# Patient Record
Sex: MALE | Race: WHITE | NOT HISPANIC OR LATINO | URBAN - METROPOLITAN AREA
[De-identification: names, ages, dates, MRNs, and addresses within clinical notes are randomized per-mention and may not be internally consistent; named-entity substitution may affect disease eponyms.]

---

## 2017-01-01 ENCOUNTER — OFFICE VISIT - HEALTHEAST (OUTPATIENT)
Dept: FAMILY MEDICINE | Facility: CLINIC | Age: 82
End: 2017-01-01

## 2017-01-01 ENCOUNTER — COMMUNICATION - HEALTHEAST (OUTPATIENT)
Dept: FAMILY MEDICINE | Facility: CLINIC | Age: 82
End: 2017-01-01

## 2017-01-01 ENCOUNTER — AMBULATORY - HEALTHEAST (OUTPATIENT)
Dept: CARDIOLOGY | Facility: CLINIC | Age: 82
End: 2017-01-01

## 2017-01-01 ENCOUNTER — OFFICE VISIT - HEALTHEAST (OUTPATIENT)
Dept: PULMONOLOGY | Facility: OTHER | Age: 82
End: 2017-01-01

## 2017-01-01 ENCOUNTER — RECORDS - HEALTHEAST (OUTPATIENT)
Dept: ADMINISTRATIVE | Facility: OTHER | Age: 82
End: 2017-01-01

## 2017-01-01 ENCOUNTER — RECORDS - HEALTHEAST (OUTPATIENT)
Dept: VASCULAR ULTRASOUND | Facility: CLINIC | Age: 82
End: 2017-01-01

## 2017-01-01 ENCOUNTER — OFFICE VISIT - HEALTHEAST (OUTPATIENT)
Dept: CARDIOLOGY | Facility: CLINIC | Age: 82
End: 2017-01-01

## 2017-01-01 ENCOUNTER — COMMUNICATION - HEALTHEAST (OUTPATIENT)
Dept: CARDIOLOGY | Facility: CLINIC | Age: 82
End: 2017-01-01

## 2017-01-01 ENCOUNTER — HOSPITAL ENCOUNTER (OUTPATIENT)
Dept: ULTRASOUND IMAGING | Facility: HOSPITAL | Age: 82
Discharge: HOME | End: 2017-09-26
Attending: INTERNAL MEDICINE

## 2017-01-01 ENCOUNTER — COMMUNICATION - HEALTHEAST (OUTPATIENT)
Dept: SCHEDULING | Facility: CLINIC | Age: 82
End: 2017-01-01

## 2017-01-01 ENCOUNTER — COMMUNICATION - HEALTHEAST (OUTPATIENT)
Dept: PHYSICAL THERAPY | Facility: CLINIC | Age: 82
End: 2017-01-01

## 2017-01-01 ENCOUNTER — OFFICE VISIT - HEALTHEAST (OUTPATIENT)
Dept: VASCULAR SURGERY | Facility: CLINIC | Age: 82
End: 2017-01-01

## 2017-01-01 ENCOUNTER — HOSPITAL ENCOUNTER (OUTPATIENT)
Dept: RADIOLOGY | Facility: HOSPITAL | Age: 82
Discharge: HOME | End: 2017-09-26
Attending: INTERNAL MEDICINE

## 2017-01-01 ENCOUNTER — AMBULATORY - HEALTHEAST (OUTPATIENT)
Dept: ULTRASOUND IMAGING | Facility: HOSPITAL | Age: 82
End: 2017-01-01

## 2017-01-01 DIAGNOSIS — I48.19 PERSISTENT ATRIAL FIBRILLATION (H): ICD-10-CM

## 2017-01-01 DIAGNOSIS — Z98.890 HX OF ENDARTERECTOMY: ICD-10-CM

## 2017-01-01 DIAGNOSIS — J90 PLEURAL EFFUSION, RIGHT: ICD-10-CM

## 2017-01-01 DIAGNOSIS — R05.9 COUGH: ICD-10-CM

## 2017-01-01 DIAGNOSIS — J90 PLEURAL EFFUSION: ICD-10-CM

## 2017-01-01 DIAGNOSIS — I25.10 ATHEROSCLEROSIS OF NATIVE CORONARY ARTERY OF NATIVE HEART WITHOUT ANGINA PECTORIS: ICD-10-CM

## 2017-01-01 DIAGNOSIS — I73.9 PERIPHERAL VASCULAR DISEASE, UNSPECIFIED (H): ICD-10-CM

## 2017-01-01 DIAGNOSIS — J01.90 ACUTE SINUSITIS: ICD-10-CM

## 2017-01-01 DIAGNOSIS — I25.10 CORONARY ARTERY DISEASE DUE TO CALCIFIED CORONARY LESION: ICD-10-CM

## 2017-01-01 DIAGNOSIS — I10 HTN (HYPERTENSION): ICD-10-CM

## 2017-01-01 DIAGNOSIS — Z00.00 HEALTHCARE MAINTENANCE: ICD-10-CM

## 2017-01-01 DIAGNOSIS — K64.4 EXTERNAL HEMORRHOID: ICD-10-CM

## 2017-01-01 DIAGNOSIS — L30.9 PERIANAL DERMATITIS: ICD-10-CM

## 2017-01-01 DIAGNOSIS — I73.9 PAD (PERIPHERAL ARTERY DISEASE) (H): ICD-10-CM

## 2017-01-01 DIAGNOSIS — R06.02 SOB (SHORTNESS OF BREATH): ICD-10-CM

## 2017-01-01 DIAGNOSIS — I10 ESSENTIAL HYPERTENSION: ICD-10-CM

## 2017-01-01 DIAGNOSIS — I25.84 CORONARY ARTERY DISEASE DUE TO CALCIFIED CORONARY LESION: ICD-10-CM

## 2017-01-01 DIAGNOSIS — E78.5 HLD (HYPERLIPIDEMIA): ICD-10-CM

## 2017-01-01 DIAGNOSIS — K92.1 HEMATOCHEZIA: ICD-10-CM

## 2017-01-01 DIAGNOSIS — I73.9 PVD (PERIPHERAL VASCULAR DISEASE) (H): ICD-10-CM

## 2017-01-01 DIAGNOSIS — Z23 NEED FOR VACCINATION: ICD-10-CM

## 2017-01-01 DIAGNOSIS — Z87.891 HISTORY OF TOBACCO ABUSE: ICD-10-CM

## 2017-01-01 LAB
BACTERIA SPEC CULT: NORMAL
LAB AP CHARGES (HE HISTORICAL CONVERSION): ABNORMAL
LAB AP CHARGES (HE HISTORICAL CONVERSION): NORMAL
LAB MED GENERAL PATH INTERP (HE HISTORICAL CONVERSION): ABNORMAL
PATH REPORT.COMMENTS IMP SPEC: ABNORMAL
PATH REPORT.COMMENTS IMP SPEC: NORMAL
PATH REPORT.COMMENTS IMP SPEC: NORMAL
PATH REPORT.FINAL DX SPEC: ABNORMAL
PATH REPORT.FINAL DX SPEC: NORMAL
PATH REPORT.MICROSCOPIC SPEC OTHER STN: ABNORMAL
PATH REPORT.MICROSCOPIC SPEC OTHER STN: NORMAL
PATH REPORT.RELEVANT HX SPEC: ABNORMAL
RESULT FLAG (HE HISTORICAL CONVERSION): NORMAL
SPECIMEN DESCRIPTION: ABNORMAL

## 2017-01-01 ASSESSMENT — MIFFLIN-ST. JEOR
SCORE: 1435.57
SCORE: 1426.49
SCORE: 1440.1
SCORE: 1433.3

## 2017-01-05 ENCOUNTER — HOSPITAL ENCOUNTER (OUTPATIENT)
Dept: CARDIOLOGY | Facility: CLINIC | Age: 82
Discharge: HOME | End: 2017-01-05
Attending: NURSE PRACTITIONER

## 2017-01-05 DIAGNOSIS — I48.19 PERSISTENT ATRIAL FIBRILLATION (H): ICD-10-CM

## 2017-01-12 ENCOUNTER — AMBULATORY - HEALTHEAST (OUTPATIENT)
Dept: CARDIOLOGY | Facility: CLINIC | Age: 82
End: 2017-01-12

## 2017-01-12 DIAGNOSIS — I48.19 PERSISTENT ATRIAL FIBRILLATION (H): ICD-10-CM

## 2017-01-24 ENCOUNTER — COMMUNICATION - HEALTHEAST (OUTPATIENT)
Dept: FAMILY MEDICINE | Facility: CLINIC | Age: 82
End: 2017-01-24

## 2017-01-24 DIAGNOSIS — I10 ESSENTIAL HYPERTENSION: ICD-10-CM

## 2017-01-24 DIAGNOSIS — I25.10 CORONARY ATHEROSCLEROSIS: ICD-10-CM

## 2017-01-24 DIAGNOSIS — I73.9 PERIPHERAL VASCULAR DISEASE, UNSPECIFIED (H): ICD-10-CM

## 2017-01-26 ENCOUNTER — COMMUNICATION - HEALTHEAST (OUTPATIENT)
Dept: FAMILY MEDICINE | Facility: CLINIC | Age: 82
End: 2017-01-26

## 2017-02-06 ENCOUNTER — OFFICE VISIT - HEALTHEAST (OUTPATIENT)
Dept: CARDIOLOGY | Facility: CLINIC | Age: 82
End: 2017-02-06

## 2017-02-06 DIAGNOSIS — I25.10 CORONARY ATHEROSCLEROSIS DUE TO CALCIFIED CORONARY LESION: ICD-10-CM

## 2017-02-06 DIAGNOSIS — I25.84 CORONARY ATHEROSCLEROSIS DUE TO CALCIFIED CORONARY LESION: ICD-10-CM

## 2017-02-06 ASSESSMENT — MIFFLIN-ST. JEOR: SCORE: 1461.64

## 2017-02-09 ENCOUNTER — AMBULATORY - HEALTHEAST (OUTPATIENT)
Dept: CARDIOLOGY | Facility: CLINIC | Age: 82
End: 2017-02-09

## 2017-02-09 ENCOUNTER — COMMUNICATION - HEALTHEAST (OUTPATIENT)
Dept: CARDIOLOGY | Facility: CLINIC | Age: 82
End: 2017-02-09

## 2017-02-09 DIAGNOSIS — I48.19 PERSISTENT ATRIAL FIBRILLATION (H): ICD-10-CM

## 2017-02-16 ENCOUNTER — OFFICE VISIT - HEALTHEAST (OUTPATIENT)
Dept: FAMILY MEDICINE | Facility: CLINIC | Age: 82
End: 2017-02-16

## 2017-02-16 ENCOUNTER — COMMUNICATION - HEALTHEAST (OUTPATIENT)
Dept: FAMILY MEDICINE | Facility: CLINIC | Age: 82
End: 2017-02-16

## 2017-02-16 DIAGNOSIS — Z13.29 SCREENING FOR ENDOCRINE DISORDER: ICD-10-CM

## 2017-02-16 DIAGNOSIS — I48.19 PERSISTENT ATRIAL FIBRILLATION (H): ICD-10-CM

## 2017-02-16 DIAGNOSIS — E78.5 HYPERLIPIDEMIA: ICD-10-CM

## 2017-02-16 DIAGNOSIS — I25.10 ATHEROSCLEROSIS OF NATIVE CORONARY ARTERY OF NATIVE HEART WITHOUT ANGINA PECTORIS: ICD-10-CM

## 2017-02-16 DIAGNOSIS — I10 ESSENTIAL HYPERTENSION: ICD-10-CM

## 2017-02-16 DIAGNOSIS — I73.9 PVD (PERIPHERAL VASCULAR DISEASE) (H): ICD-10-CM

## 2017-02-16 LAB
CHOLEST SERPL-MCNC: 136 MG/DL
FASTING STATUS PATIENT QL REPORTED: YES
HDLC SERPL-MCNC: 40 MG/DL
LDLC SERPL CALC-MCNC: 59 MG/DL
TRIGL SERPL-MCNC: 186 MG/DL

## 2017-02-16 ASSESSMENT — MIFFLIN-ST. JEOR: SCORE: 1438.74

## 2017-02-20 ENCOUNTER — RECORDS - HEALTHEAST (OUTPATIENT)
Dept: ADMINISTRATIVE | Facility: OTHER | Age: 82
End: 2017-02-20

## 2017-03-09 ENCOUNTER — AMBULATORY - HEALTHEAST (OUTPATIENT)
Dept: CARDIOLOGY | Facility: CLINIC | Age: 82
End: 2017-03-09

## 2017-03-09 DIAGNOSIS — I48.19 PERSISTENT ATRIAL FIBRILLATION (H): ICD-10-CM

## 2017-03-27 ENCOUNTER — RECORDS - HEALTHEAST (OUTPATIENT)
Dept: ADMINISTRATIVE | Facility: OTHER | Age: 82
End: 2017-03-27

## 2017-04-06 ENCOUNTER — AMBULATORY - HEALTHEAST (OUTPATIENT)
Dept: CARDIOLOGY | Facility: CLINIC | Age: 82
End: 2017-04-06

## 2017-04-06 DIAGNOSIS — I48.19 PERSISTENT ATRIAL FIBRILLATION (H): ICD-10-CM

## 2018-01-01 ENCOUNTER — AMBULATORY - HEALTHEAST (OUTPATIENT)
Dept: CARDIOLOGY | Facility: CLINIC | Age: 83
End: 2018-01-01

## 2018-01-01 ENCOUNTER — AMBULATORY - HEALTHEAST (OUTPATIENT)
Dept: MULTI SPECIALTY CLINIC | Facility: CLINIC | Age: 83
End: 2018-01-01

## 2018-01-01 ENCOUNTER — COMMUNICATION - HEALTHEAST (OUTPATIENT)
Dept: FAMILY MEDICINE | Facility: CLINIC | Age: 83
End: 2018-01-01

## 2018-01-01 ENCOUNTER — HOME CARE/HOSPICE - HEALTHEAST (OUTPATIENT)
Dept: HOSPICE | Facility: HOSPICE | Age: 83
End: 2018-01-01

## 2018-01-01 ENCOUNTER — HOSPITAL ENCOUNTER (OUTPATIENT)
Dept: RADIOLOGY | Facility: HOSPITAL | Age: 83
Discharge: HOME | End: 2018-02-07
Attending: INTERNAL MEDICINE

## 2018-01-01 ENCOUNTER — HOSPITAL ENCOUNTER (OUTPATIENT)
Dept: RESPIRATORY THERAPY | Facility: HOSPITAL | Age: 83
Discharge: HOME | End: 2018-03-15
Attending: SURGERY

## 2018-01-01 ENCOUNTER — AMBULATORY - HEALTHEAST (OUTPATIENT)
Dept: HOSPICE | Facility: HOSPICE | Age: 83
End: 2018-01-01

## 2018-01-01 ENCOUNTER — ANESTHESIA - HEALTHEAST (OUTPATIENT)
Dept: SURGERY | Facility: CLINIC | Age: 83
End: 2018-01-01

## 2018-01-01 ENCOUNTER — HOSPITAL ENCOUNTER (OUTPATIENT)
Dept: NUCLEAR MEDICINE | Facility: HOSPITAL | Age: 83
Discharge: HOME | End: 2018-03-19
Attending: INTERNAL MEDICINE

## 2018-01-01 ENCOUNTER — COMMUNICATION - HEALTHEAST (OUTPATIENT)
Dept: CARDIOLOGY | Facility: CLINIC | Age: 83
End: 2018-01-01

## 2018-01-01 ENCOUNTER — HOME CARE/HOSPICE - HEALTHEAST (OUTPATIENT)
Dept: HOME HEALTH SERVICES | Facility: HOME HEALTH | Age: 83
End: 2018-01-01

## 2018-01-01 ENCOUNTER — RECORDS - HEALTHEAST (OUTPATIENT)
Dept: ADMINISTRATIVE | Facility: OTHER | Age: 83
End: 2018-01-01

## 2018-01-01 ENCOUNTER — OFFICE VISIT - HEALTHEAST (OUTPATIENT)
Dept: PULMONOLOGY | Facility: OTHER | Age: 83
End: 2018-01-01

## 2018-01-01 ENCOUNTER — OFFICE VISIT - HEALTHEAST (OUTPATIENT)
Dept: FAMILY MEDICINE | Facility: CLINIC | Age: 83
End: 2018-01-01

## 2018-01-01 ENCOUNTER — COMMUNICATION - HEALTHEAST (OUTPATIENT)
Dept: PULMONOLOGY | Facility: OTHER | Age: 83
End: 2018-01-01

## 2018-01-01 ENCOUNTER — AMBULATORY - HEALTHEAST (OUTPATIENT)
Dept: ONCOLOGY | Facility: CLINIC | Age: 83
End: 2018-01-01

## 2018-01-01 ENCOUNTER — AMBULATORY - HEALTHEAST (OUTPATIENT)
Dept: ULTRASOUND IMAGING | Facility: HOSPITAL | Age: 83
End: 2018-01-01

## 2018-01-01 ENCOUNTER — OFFICE VISIT - HEALTHEAST (OUTPATIENT)
Dept: CARDIOLOGY | Facility: CLINIC | Age: 83
End: 2018-01-01

## 2018-01-01 ENCOUNTER — HOSPITAL ENCOUNTER (OUTPATIENT)
Dept: CT IMAGING | Facility: HOSPITAL | Age: 83
Discharge: HOME | End: 2018-02-19
Attending: INTERNAL MEDICINE

## 2018-01-01 ENCOUNTER — HOSPITAL ENCOUNTER (OUTPATIENT)
Dept: RADIOLOGY | Facility: HOSPITAL | Age: 83
Discharge: HOME | End: 2018-02-08

## 2018-01-01 ENCOUNTER — SURGERY - HEALTHEAST (OUTPATIENT)
Dept: SURGERY | Facility: CLINIC | Age: 83
End: 2018-01-01

## 2018-01-01 ENCOUNTER — HOSPITAL ENCOUNTER (OUTPATIENT)
Dept: RADIOLOGY | Facility: HOSPITAL | Age: 83
Discharge: HOME | End: 2018-03-05
Attending: INTERNAL MEDICINE

## 2018-01-01 ENCOUNTER — HOSPITAL ENCOUNTER (OUTPATIENT)
Dept: CARDIOLOGY | Facility: HOSPITAL | Age: 83
Discharge: HOME | End: 2018-03-19
Attending: INTERNAL MEDICINE

## 2018-01-01 ENCOUNTER — COMMUNICATION - HEALTHEAST (OUTPATIENT)
Dept: ADMINISTRATIVE | Facility: CLINIC | Age: 83
End: 2018-01-01

## 2018-01-01 ENCOUNTER — AMBULATORY - HEALTHEAST (OUTPATIENT)
Dept: PULMONOLOGY | Facility: OTHER | Age: 83
End: 2018-01-01

## 2018-01-01 ENCOUNTER — HOSPITAL ENCOUNTER (OUTPATIENT)
Dept: ULTRASOUND IMAGING | Facility: HOSPITAL | Age: 83
Discharge: HOME | End: 2018-02-19
Attending: INTERNAL MEDICINE | Admitting: RADIOLOGY

## 2018-01-01 DIAGNOSIS — I48.19 PERSISTENT ATRIAL FIBRILLATION (H): ICD-10-CM

## 2018-01-01 DIAGNOSIS — R13.10 DYSPHAGIA: ICD-10-CM

## 2018-01-01 DIAGNOSIS — I25.10 CORONARY ATHEROSCLEROSIS DUE TO CALCIFIED CORONARY LESION: ICD-10-CM

## 2018-01-01 DIAGNOSIS — I10 ESSENTIAL HYPERTENSION: ICD-10-CM

## 2018-01-01 DIAGNOSIS — R55 SYNCOPAL EPISODES: ICD-10-CM

## 2018-01-01 DIAGNOSIS — D64.9 NORMOCHROMIC NORMOCYTIC ANEMIA: ICD-10-CM

## 2018-01-01 DIAGNOSIS — I25.84 CORONARY ATHEROSCLEROSIS DUE TO CALCIFIED CORONARY LESION: ICD-10-CM

## 2018-01-01 DIAGNOSIS — I25.10 ATHEROSCLEROSIS OF NATIVE CORONARY ARTERY OF NATIVE HEART WITHOUT ANGINA PECTORIS: ICD-10-CM

## 2018-01-01 DIAGNOSIS — R94.2 ABNORMAL PFTS: ICD-10-CM

## 2018-01-01 DIAGNOSIS — J90 PLEURAL EFFUSION: ICD-10-CM

## 2018-01-01 DIAGNOSIS — C45.7 MESOTHELIOMA OF RIGHT LUNG (H): ICD-10-CM

## 2018-01-01 DIAGNOSIS — R05.9 COUGH: ICD-10-CM

## 2018-01-01 DIAGNOSIS — J90 RECURRENT RIGHT PLEURAL EFFUSION: ICD-10-CM

## 2018-01-01 DIAGNOSIS — Z01.810 PREOP CARDIOVASCULAR EXAM: ICD-10-CM

## 2018-01-01 DIAGNOSIS — E78.00 HYPERCHOLESTEREMIA: ICD-10-CM

## 2018-01-01 DIAGNOSIS — R06.09 DYSPNEA ON EXERTION: ICD-10-CM

## 2018-01-01 DIAGNOSIS — I49.5 SSS (SICK SINUS SYNDROME) (H): ICD-10-CM

## 2018-01-01 DIAGNOSIS — Z78.9 BASELINE FORCED EXPIRATORY VOLUME AT END OF ONE SECOND (FEV1) 30% TO 80% PREDICTED: ICD-10-CM

## 2018-01-01 DIAGNOSIS — R53.83 FATIGUE: ICD-10-CM

## 2018-01-01 LAB
ALBUMIN SERPL-MCNC: 3.7 G/DL (ref 3.5–5)
ALP SERPL-CCNC: 91 U/L (ref 45–120)
ALT SERPL W P-5'-P-CCNC: 19 U/L (ref 0–45)
ANION GAP SERPL CALCULATED.3IONS-SCNC: 12 MMOL/L (ref 5–18)
ANION GAP SERPL CALCULATED.3IONS-SCNC: 8 MMOL/L (ref 5–18)
ANION GAP SERPL CALCULATED.3IONS-SCNC: 9 MMOL/L (ref 5–18)
APPEARANCE FLD: ABNORMAL
AST SERPL W P-5'-P-CCNC: 22 U/L (ref 0–40)
ATRIAL RATE - MUSE: 357 BPM
BACTERIA SPEC CULT: NO GROWTH
BASE EXCESS BLDA CALC-SCNC: 2.3 MMOL/L
BILIRUB SERPL-MCNC: 0.6 MG/DL (ref 0–1)
BUN SERPL-MCNC: 18 MG/DL (ref 8–28)
BUN SERPL-MCNC: 21 MG/DL (ref 8–28)
BUN SERPL-MCNC: 21 MG/DL (ref 8–28)
CALCIUM SERPL-MCNC: 8.6 MG/DL (ref 8.5–10.5)
CALCIUM SERPL-MCNC: 9.3 MG/DL (ref 8.5–10.5)
CALCIUM SERPL-MCNC: 9.4 MG/DL (ref 8.5–10.5)
CHLORIDE BLD-SCNC: 100 MMOL/L (ref 98–107)
CHLORIDE BLD-SCNC: 100 MMOL/L (ref 98–107)
CHLORIDE BLD-SCNC: 98 MMOL/L (ref 98–107)
CO2 SERPL-SCNC: 24 MMOL/L (ref 22–31)
CO2 SERPL-SCNC: 28 MMOL/L (ref 22–31)
CO2 SERPL-SCNC: 29 MMOL/L (ref 22–31)
COHGB MFR BLD: 93.9 % (ref 95–96)
COLOR FLD: ABNORMAL
CREAT SERPL-MCNC: 0.86 MG/DL (ref 0.7–1.3)
CREAT SERPL-MCNC: 0.92 MG/DL (ref 0.7–1.3)
CREAT SERPL-MCNC: 1.02 MG/DL (ref 0.7–1.3)
CV STRESS CURRENT BP HE: NORMAL
CV STRESS CURRENT HR HE: 102
CV STRESS CURRENT HR HE: 103
CV STRESS CURRENT HR HE: 104
CV STRESS CURRENT HR HE: 105
CV STRESS CURRENT HR HE: 109
CV STRESS CURRENT HR HE: 89
CV STRESS CURRENT HR HE: 92
CV STRESS CURRENT HR HE: 92
CV STRESS CURRENT HR HE: 94
CV STRESS CURRENT HR HE: 94
CV STRESS CURRENT HR HE: 95
CV STRESS CURRENT HR HE: 96
CV STRESS CURRENT HR HE: 97
CV STRESS CURRENT HR HE: 97
CV STRESS DEVIATION TIME HE: NORMAL
CV STRESS ECHO PERCENT HR HE: NORMAL
CV STRESS EXERCISE STAGE HE: NORMAL
CV STRESS FINAL RESTING BP HE: NORMAL
CV STRESS FINAL RESTING HR HE: 104
CV STRESS MAX HR HE: 113
CV STRESS MAX TREADMILL GRADE HE: 0
CV STRESS MAX TREADMILL SPEED HE: 0
CV STRESS PEAK DIA BP HE: NORMAL
CV STRESS PEAK SYS BP HE: NORMAL
CV STRESS PHASE HE: NORMAL
CV STRESS PROTOCOL HE: NORMAL
CV STRESS RESTING PT POSITION HE: NORMAL
CV STRESS ST DEVIATION AMOUNT HE: NORMAL
CV STRESS ST DEVIATION ELEVATION HE: NORMAL
CV STRESS ST EVELATION AMOUNT HE: NORMAL
CV STRESS TEST TYPE HE: NORMAL
CV STRESS TOTAL STAGE TIME MIN 1 HE: NORMAL
DIASTOLIC BLOOD PRESSURE - MUSE: NORMAL MMHG
EOSINOPHIL NFR FLD MANUAL: ABNORMAL %
ERYTHROCYTE [DISTWIDTH] IN BLOOD BY AUTOMATED COUNT: 12 % (ref 11–14.5)
ERYTHROCYTE [DISTWIDTH] IN BLOOD BY AUTOMATED COUNT: 12.1 % (ref 11–14.5)
ERYTHROCYTE [DISTWIDTH] IN BLOOD BY AUTOMATED COUNT: 12.8 % (ref 11–14.5)
GFR SERPL CREATININE-BSD FRML MDRD: >60 ML/MIN/1.73M2
GLUCOSE BLD-MCNC: 105 MG/DL (ref 70–125)
GLUCOSE BLD-MCNC: 122 MG/DL (ref 70–125)
GLUCOSE BLD-MCNC: 129 MG/DL (ref 70–125)
GLUCOSE FLD-MCNC: 45 MG/DL
GRAM STAIN RESULT: NORMAL
GRAM STAIN RESULT: NORMAL
HCO3, ARTERIAL CALC - HISTORICAL: 26.6 MMOL/L (ref 23–29)
HCT VFR BLD AUTO: 41.1 % (ref 40–54)
HCT VFR BLD AUTO: 43.4 % (ref 40–54)
HCT VFR BLD AUTO: 43.9 % (ref 40–54)
HGB BLD-MCNC: 13.2 G/DL (ref 14–18)
HGB BLD-MCNC: 14.2 G/DL (ref 14–18)
HGB BLD-MCNC: 14.2 G/DL (ref 14–18)
INTERNATIONAL NORMALIZATION RATIO, POC - HISTORICAL: 1.3
INTERNATIONAL NORMALIZATION RATIO, POC - HISTORICAL: 2
INTERNATIONAL NORMALIZATION RATIO, POC - HISTORICAL: 2
INTERNATIONAL NORMALIZATION RATIO, POC - HISTORICAL: 2.3
INTERPRETATION ECG - MUSE: NORMAL
LAB AP CHARGES (HE HISTORICAL CONVERSION): ABNORMAL
LAB MED GENERAL PATH INTERP (HE HISTORICAL CONVERSION): ABNORMAL
LDH FLD L TO P-CCNC: 1066 U/L
LIPASE FLD-CCNC: 36 U/L
LYMPHOCYTES NFR FLD MANUAL: 71 %
Lab: NORMAL
MACROPHAGE % - HISTORICAL: 2 %
MCH RBC QN AUTO: 28 PG (ref 27–34)
MCH RBC QN AUTO: 29.6 PG (ref 27–34)
MCH RBC QN AUTO: 30.3 PG (ref 27–34)
MCHC RBC AUTO-ENTMCNC: 32.2 G/DL (ref 32–36)
MCHC RBC AUTO-ENTMCNC: 32.3 G/DL (ref 32–36)
MCHC RBC AUTO-ENTMCNC: 32.7 G/DL (ref 32–36)
MCV RBC AUTO: 87 FL (ref 80–100)
MCV RBC AUTO: 91 FL (ref 80–100)
MCV RBC AUTO: 94 FL (ref 80–100)
MESOTHELIALS, FLUID: ABNORMAL %
MONOCYTE % - HISTORICAL: 25 %
NEUTS BAND NFR FLD MANUAL: 2 %
NUC STRESS EJECTION FRACTION: 69 %
O2/TOTAL GAS SETTING VFR VENT: ABNORMAL %
OTHER CELLS FLD MANUAL: ABNORMAL %
OXYHEMOGLOBIN - HISTORICAL: 91 % (ref 95–96)
P AXIS - MUSE: NORMAL DEGREES
PATH REPORT.COMMENTS IMP SPEC: ABNORMAL
PATH REPORT.FINAL DX SPEC: ABNORMAL
PATH REPORT.MICROSCOPIC SPEC OTHER STN: ABNORMAL
PATH REPORT.RELEVANT HX SPEC: ABNORMAL
PCO2 BLD: 39 MM HG (ref 35–45)
PH BLD: 7.44 [PH] (ref 7.37–7.44)
PLATELET # BLD AUTO: 216 THOU/UL (ref 140–440)
PLATELET # BLD AUTO: 225 THOU/UL (ref 140–440)
PLATELET # BLD AUTO: 329 THOU/UL (ref 140–440)
PMV BLD AUTO: 6.9 FL (ref 7–10)
PMV BLD AUTO: 7.4 FL (ref 7–10)
PMV BLD AUTO: 7.6 FL (ref 7–10)
PO2 BLD: 59 MM HG (ref 75–85)
POTASSIUM BLD-SCNC: 4.2 MMOL/L (ref 3.5–5)
POTASSIUM BLD-SCNC: 4.4 MMOL/L (ref 3.5–5)
POTASSIUM BLD-SCNC: 4.8 MMOL/L (ref 3.5–5)
PR INTERVAL - MUSE: NORMAL MS
PROT FLD-MCNC: 5 G/DL
PROT SERPL-MCNC: 7.9 G/DL (ref 6–8)
QRS DURATION - MUSE: 90 MS
QT - MUSE: 374 MS
QTC - MUSE: 431 MS
R AXIS - MUSE: 14 DEGREES
RBC # BLD AUTO: 4.67 MILL/UL (ref 4.4–6.2)
RBC # BLD AUTO: 4.72 MILL/UL (ref 4.4–6.2)
RBC # BLD AUTO: 4.79 MILL/UL (ref 4.4–6.2)
RBC FLUID - HISTORICAL: ABNORMAL /UL
RESULT FLAG (HE HISTORICAL CONVERSION): ABNORMAL
SODIUM SERPL-SCNC: 134 MMOL/L (ref 136–145)
SODIUM SERPL-SCNC: 136 MMOL/L (ref 136–145)
SODIUM SERPL-SCNC: 138 MMOL/L (ref 136–145)
SPECIMEN DESCRIPTION: ABNORMAL
STRESS ECHO BASELINE BP: NORMAL
STRESS ECHO BASELINE HR: 88
STRESS ECHO CALCULATED PERCENT HR: 84 %
STRESS ECHO LAST STRESS BP: NORMAL
STRESS ECHO LAST STRESS HR: 105
SYSTOLIC BLOOD PRESSURE - MUSE: NORMAL MMHG
T AXIS - MUSE: 68 DEGREES
TEMPERATURE: 37 DEGREES C
TRIGL FLD-MCNC: 15.3 MG/DL
TSH SERPL DL<=0.005 MIU/L-ACNC: 1.91 UIU/ML (ref 0.3–5)
VENTILATION MODE: ABNORMAL
VENTRICULAR RATE- MUSE: 80 BPM
WBC # FLD AUTO: 1127 /UL (ref 0–99)
WBC: 7.1 THOU/UL (ref 4–11)
WBC: 7.5 THOU/UL (ref 4–11)
WBC: 9.2 THOU/UL (ref 4–11)

## 2018-01-01 RX ORDER — HALOPERIDOL 2 MG/ML
0.5 SOLUTION ORAL EVERY 4 HOURS PRN
Status: SHIPPED | COMMUNITY
Start: 2018-01-01

## 2018-01-01 RX ORDER — HYDROMORPHONE HYDROCHLORIDE 1 MG/ML
2-4 SOLUTION ORAL
Status: SHIPPED | COMMUNITY
Start: 2018-01-01

## 2018-01-01 RX ORDER — DEXTROMETHORPHAN HBR. AND GUAIFENESIN 10; 100 MG/5ML; MG/5ML
10 SOLUTION ORAL EVERY 4 HOURS PRN
Status: SHIPPED | COMMUNITY
Start: 2018-01-01

## 2018-01-01 RX ORDER — SALIVA STIMULANT COMB. NO.3
1-2 SPRAY, NON-AEROSOL (ML) MUCOUS MEMBRANE PRN
Status: SHIPPED | COMMUNITY
Start: 2018-01-01

## 2018-01-01 ASSESSMENT — MIFFLIN-ST. JEOR
SCORE: 1317.34
SCORE: 1311.67
SCORE: 1369.79
SCORE: 1354.37
SCORE: 1378.86
SCORE: 1364.35
SCORE: 1360.72
SCORE: 1365.82
SCORE: 1416.28
SCORE: 1374.89
SCORE: 1360.27

## 2018-04-30 ENCOUNTER — COMMUNICATION - HEALTHEAST (OUTPATIENT)
Dept: FAMILY MEDICINE | Facility: CLINIC | Age: 83
End: 2018-04-30

## 2018-04-30 DIAGNOSIS — I10 ESSENTIAL HYPERTENSION: ICD-10-CM

## 2018-06-12 ENCOUNTER — COMMUNICATION - HEALTHEAST (OUTPATIENT)
Dept: ANTICOAGULATION | Facility: CLINIC | Age: 83
End: 2018-06-12

## 2018-06-12 DIAGNOSIS — I48.19 PERSISTENT ATRIAL FIBRILLATION (H): ICD-10-CM

## 2021-05-25 ENCOUNTER — RECORDS - HEALTHEAST (OUTPATIENT)
Dept: ADMINISTRATIVE | Facility: CLINIC | Age: 86
End: 2021-05-25

## 2021-05-26 ENCOUNTER — RECORDS - HEALTHEAST (OUTPATIENT)
Dept: ADMINISTRATIVE | Facility: CLINIC | Age: 86
End: 2021-05-26

## 2021-05-27 ENCOUNTER — RECORDS - HEALTHEAST (OUTPATIENT)
Dept: ADMINISTRATIVE | Facility: CLINIC | Age: 86
End: 2021-05-27

## 2021-05-28 ENCOUNTER — RECORDS - HEALTHEAST (OUTPATIENT)
Dept: ADMINISTRATIVE | Facility: CLINIC | Age: 86
End: 2021-05-28

## 2021-05-29 ENCOUNTER — RECORDS - HEALTHEAST (OUTPATIENT)
Dept: ADMINISTRATIVE | Facility: CLINIC | Age: 86
End: 2021-05-29

## 2021-05-30 VITALS — WEIGHT: 172.7 LBS | BODY MASS INDEX: 25.58 KG/M2 | HEIGHT: 69 IN

## 2021-05-30 VITALS — WEIGHT: 176 LBS | HEIGHT: 70 IN | BODY MASS INDEX: 25.2 KG/M2

## 2021-05-30 VITALS — BODY MASS INDEX: 25.55 KG/M2 | WEIGHT: 173 LBS

## 2021-05-31 ENCOUNTER — RECORDS - HEALTHEAST (OUTPATIENT)
Dept: ADMINISTRATIVE | Facility: CLINIC | Age: 86
End: 2021-05-31

## 2021-05-31 VITALS — HEIGHT: 69 IN | BODY MASS INDEX: 25.48 KG/M2 | WEIGHT: 172 LBS

## 2021-05-31 VITALS — WEIGHT: 171.7 LBS | BODY MASS INDEX: 25.36 KG/M2

## 2021-05-31 VITALS — HEIGHT: 69 IN | BODY MASS INDEX: 25.4 KG/M2 | WEIGHT: 171.5 LBS

## 2021-05-31 VITALS
BODY MASS INDEX: 25.18 KG/M2 | WEIGHT: 170 LBS | BODY MASS INDEX: 25.62 KG/M2 | HEIGHT: 69 IN | HEIGHT: 69 IN | WEIGHT: 173 LBS

## 2021-05-31 VITALS — BODY MASS INDEX: 24.89 KG/M2 | WEIGHT: 171 LBS

## 2021-05-31 VITALS — WEIGHT: 171 LBS | BODY MASS INDEX: 25.25 KG/M2

## 2021-05-31 VITALS — BODY MASS INDEX: 25.55 KG/M2 | WEIGHT: 173 LBS

## 2021-06-01 ENCOUNTER — RECORDS - HEALTHEAST (OUTPATIENT)
Dept: ADMINISTRATIVE | Facility: CLINIC | Age: 86
End: 2021-06-01

## 2021-06-01 VITALS — WEIGHT: 149.44 LBS | BODY MASS INDEX: 22.65 KG/M2 | HEIGHT: 68 IN

## 2021-06-01 VITALS — HEIGHT: 68 IN | BODY MASS INDEX: 24.71 KG/M2 | WEIGHT: 163 LBS

## 2021-06-01 VITALS — WEIGHT: 166 LBS | HEIGHT: 70 IN | BODY MASS INDEX: 23.77 KG/M2

## 2021-06-01 VITALS — WEIGHT: 148 LBS | BODY MASS INDEX: 22.5 KG/M2

## 2021-06-01 VITALS — BODY MASS INDEX: 24.1 KG/M2 | WEIGHT: 159 LBS | HEIGHT: 68 IN

## 2021-06-01 VITALS — WEIGHT: 172 LBS | BODY MASS INDEX: 25.04 KG/M2

## 2021-06-01 VITALS — WEIGHT: 173 LBS | BODY MASS INDEX: 25.18 KG/M2
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
## 2021-06-08 NOTE — PROGRESS NOTES
"Cardiology Progress Note    Assessment:  Coronary artery disease, status post multivessel stenting, last one in 2009, stable, no angina.    Sinus node dysfunction, mild, asymptomatic  Persistent atrial fibrillation, rate controlled, asymptomatic, on warfarin anticoagulation  Hypertension, controlled  Dyslipidemia, good control of LDL cholesterol  Peripheral arterial disease status post right femoral popliteal bypass in July 2015      Plan:  Rate control strategy for management of atrial fibrillation  I discussed with him different anticoagulants.  For now he wants to continue take warfarin.  He does not need to be taking antiplatelet agents.  He will stop aspirin to minimize risk of bleeding.  Follow-up in 6 months    Subjective:   This is 85 y.o. male who comes in today for follow-up visit.  Last year he noted that his heart rate became much faster than normal.  He typically runs heart rate in 30s and 40s at rest.  He was noticing heart rate in the 60s and 80s.  EKG was performed.  It showed atrial fibrillation with controlled ventricular response.  He had no symptoms of shortness of breath or heart palpitations.  He was seen by EP nurse practitioner.  She recommended anticoagulation and rate control strategy.  Today the patient has no new complaints.  He is active.  He goes for long walks 5-6 times a week.  He has not had any new symptoms.     Review of Systems:   General: WNL  Eyes: WNL  Ears/Nose/Throat: WNL  Lungs: WNL  Heart: Leg Swelling  Stomach: WNL  Bladder: WNL  Muscle/Joints: WNL  Skin: WNL  Nervous System: WNL  Mental Health: WNL     Blood: Easy Bruising    Objective:   Visit Vitals     /76 (Patient Site: Right Arm, Patient Position: Sitting, Cuff Size: Adult Regular)     Pulse 68     Resp 18     Ht 5' 9.5\" (1.765 m)     Wt 176 lb (79.8 kg)     BMI 25.62 kg/m2     Physical Exam:  GENERAL: no distress  NECK: No JVD  LUNGS: Clear to auscultation.  CARDIAC: irregular rhythm, S1 & S2 normal.  No " heaves, thrills, gallops or murmurs.  ABDOMEN: flat, negative hepatosplenomegaly, soft and non-tender.  EXTREMITIES: No evidence of cyanosis, clubbing or edema.    Current Outpatient Prescriptions   Medication Sig Dispense Refill     albuterol (VENTOLIN HFA) 90 mcg/actuation inhaler Inhale 2 puffs every 4 (four) hours as needed for wheezing. 1 Inhaler 0     amLODIPine (NORVASC) 5 MG tablet TAKE 1 TABLET BY MOUTH ONCE DAILY 90 tablet 2     cholecalciferol, vitamin D3, (VITAMIN D3) 2,000 unit cap Take 2,000 Units by mouth daily with supper. Takes at 5pm       folic acid (FOLVITE) 1 MG tablet TAKE 1 TABLET BY MOUTH ONCE DAILY 90 tablet 3     losartan (COZAAR) 50 MG tablet TAKE 1 TABLET (50 MG TOTAL) BY MOUTH 2 (TWO) TIMES A DAY. TAKES AT 7AM AND 5PM 180 tablet 0     multivitamin capsule Take 1 capsule by mouth daily.       nitroglycerin (NITROSTAT) 0.4 MG SL tablet 1 tablet every 5 min as needed chest pain, max 3 tablets 25 tablet 0     rosuvastatin (CRESTOR) 10 MG tablet Take 1 tablet (10 mg total) by mouth bedtime. 90 tablet 3     sod bicarb-sod chlor-neti pot pkdv 1 Package into each nostril 2 (two) times a day. 1 each 2     spironolactone (ALDACTONE) 25 MG tablet TAKE 1 TABLET (25 MG TOTAL) BY MOUTH DAILY. 90 tablet 0     warfarin (COUMADIN) 1 MG tablet Take 1 mg daily 60 tablet 0     warfarin (COUMADIN) 2 MG tablet Take 2 mg daily 60 tablet 0     No current facility-administered medications for this visit.        Cardiographics:    Echocardiogram: November 2014 Normal left ventricular size and systolic function.  Mild concentric left ventricular hypertrophy.  Left ventricular ejection fraction is visually estimated to be 70%.  No regional wall motion abnormalities.  No significant valvular abnormalities.     Stress Test: November 2014 1. Regadenoson stress.  2. No evidence of ischemia suggested by nuclear imaging.  3. Small to medium-sized area of fixed defect involving the mid to basal portion of  the  inferolateral wall, which may represent diaphragmatic attenuation, although a  small area of underlying infarct cannot be excluded.  4. Normal ejection fraction without wall motion abnormality.     Coronary angio: 2009 stent to LAD, diffuse mild to moderate disease elsewhere    Holter: January 2017 1. Persistent atrial fibrillation with controlled ventricular response.  2. Moderately frequent ventricular premature beats noted.    Lab Results:       Lab Results   Component Value Date    CHOL 133 08/15/2016    CHOL 136 11/06/2014    CHOL 149 03/31/2014     Lab Results   Component Value Date    HDL 39 (L) 08/15/2016    HDL 32 (L) 11/06/2014    HDL 38 (L) 03/31/2014     Lab Results   Component Value Date    LDLCALC 61 08/15/2016    LDLCALC 46 11/06/2014    LDLCALC 70 03/31/2014     Lab Results   Component Value Date    TRIG 165 (H) 08/15/2016    TRIG 290 (H) 11/06/2014    TRIG 204 (H) 03/31/2014     No components found for: CHOLHDL  No results found for: BNP    Hayder (Yousif)  MD Mary

## 2021-06-09 ENCOUNTER — RECORDS - HEALTHEAST (OUTPATIENT)
Dept: ADMINISTRATIVE | Facility: CLINIC | Age: 86
End: 2021-06-09

## 2021-06-10 NOTE — PROGRESS NOTES
Patient ID: Juancho aCba is a 85 y.o. male.  /74  Pulse 74  Temp 98.1  F (36.7  C)  Wt 173 lb (78.5 kg)  SpO2 99%  BMI 25.55 kg/m2    Assessment/Plan:                Diagnoses and all orders for this visit:    Hematochezia    Perianal dermatitis    External hemorrhoid    DISCUSSION  Evaluation is consistent with mild perianal dermatitis and a small external hemorrhoid that currently does not show any irritation or signs of bleeding.  I suspect that there is a clear outlet source based on his clinical history and exam findings.  I am not concerned that this needs further evaluation at this point but I have discussed with him a strategy toward preventing symptoms by utilizing Preparation H as needed as well as continuing with his high-fiber diet and adding a fiber supplement.  If he notices more concerning symptoms this will require further evaluation and he understands.  All reassurance provided.  Subjective:     HPI    Juancho Caba is an 85-year-old man whose medical history includes atrial fibrillation on anticoagulation, coronary artery disease, sick sinus syndrome, hypertension, hyperlipidemia.  He is here today concerned regarding intermittently noticing a small amount of bright red blood on the toilet tissue after a bowel movement.  This does not occur every time.  He does not have pain with bowel movements.  He has not noted more than a small amount of blood on the toilet tissue.  He has never noticed it within the bowel movement itself or in the toilet bowl.  He does not have any signs or symptoms of anemia.  His last screening colonoscopy was performed in 2010.  No significant abnormality beyond his history of polyps was noted.    Review of Systems  Complete review of systems is obtained.  Other than the specific considerations noted above complete review of systems is negative.        Objective:   Medications:  Current Outpatient Prescriptions   Medication Sig     albuterol (VENTOLIN  HFA) 90 mcg/actuation inhaler Inhale 2 puffs every 4 (four) hours as needed for wheezing.     amLODIPine (NORVASC) 5 MG tablet TAKE 1 TABLET BY MOUTH ONCE DAILY     cholecalciferol, vitamin D3, (VITAMIN D3) 2,000 unit cap Take 2,000 Units by mouth daily with supper. Takes at 5pm     folic acid (FOLVITE) 1 MG tablet TAKE 1 TABLET BY MOUTH ONCE DAILY     losartan (COZAAR) 50 MG tablet TAKE 1 TABLET (50 MG TOTAL) BY MOUTH 2 (TWO) TIMES A DAY.     multivitamin capsule Take 1 capsule by mouth daily.     nitroglycerin (NITROSTAT) 0.4 MG SL tablet 1 tablet every 5 min as needed chest pain, max 3 tablets     rosuvastatin (CRESTOR) 10 MG tablet Take 1 tablet (10 mg total) by mouth bedtime.     sod bicarb-sod chlor-neti pot pkdv 1 Package into each nostril 2 (two) times a day.     spironolactone (ALDACTONE) 25 MG tablet TAKE 1 TABLET (25 MG TOTAL) BY MOUTH DAILY.     warfarin (COUMADIN) 1 MG tablet Take 1 mg daily     warfarin (COUMADIN) 2 MG tablet Take 2 mg daily     Allergies  Allergies   Allergen Reactions     Insulins Hives     ?Insulin given in hospital 2015; unknown type??     Sulfa (Sulfonamide Antibiotics) Other (See Comments)     Eyes saw lines     Tobacco:   reports that he quit smoking about 43 years ago. His smoking use included Cigarettes. He has a 10.00 pack-year smoking history. He has never used smokeless tobacco.     Physical Exam      /74  Pulse 74  Temp 98.1  F (36.7  C)  Wt 173 lb (78.5 kg)  SpO2 99%  BMI 25.55 kg/m2      General Appearance:    Alert, cooperative, no distress   Lungs:     Clear to auscultation bilaterally, respirations unlabored   Heart:    Regular rate and rhythm, S1 and S2 normal, no murmur, rub   or gallop   Abdomen:     Soft, non-tender, bowel sounds active all four quadrants,     no masses, no organomegaly   Anal/rectal:  there is mild perianal dermatitis, there is a small external hemorrhoid in approximately the 5 o'clock position that does not seem to be irritated or  bleeding currently.  Digital rectal exam reveals no abnormality.  Anoscopy does not reveal any significant abnormality.     Skin:   Skin color, texture, turgor normal, no rashes or lesions   Neurologic:   Normal strength and sensation

## 2021-06-10 NOTE — PROGRESS NOTES
INR 3.1. Pt and wife here. Will increase greens and salads. Pt has started with metamucil. INR stable. Discussed continuing management of dose of Warfarin and returning in one month . No changes to diet needed at this time. Continue moderate intake of Vitamin K and call if increase bruising or unexplained bleeding. Call with medication changes or upcoming procedures.

## 2021-06-11 NOTE — PROGRESS NOTES
Follow-up, peripheral vascular disease, Hx of right femoral-popliteal bypass 07/15/2015. Patient reports that he continues to walk a mile and a kerwin a day, has leg pain at about 20 minutes, discomfort however does not affect his ADLs. Patients no rest pain or non-healing ulcers. Patient recently started on Warfarin, A-Fib.

## 2021-06-11 NOTE — PROGRESS NOTES
INR 3.1 spoke with pt and wife and he is good at higher level. Not bruising or bleeding noted. Eating all the greens he would like. No change in dose. After talking with pt and discussing history of greens/salads and medication change. Pt will  continue  with current diet and dosing of Warfarin.  Continue with moderation of Vit K and green leafy vegetables. Cautioned to call with increase bruising or bleeding. Reminded to call with medication change especially antibiotic. Call with any questions or concerns or any up coming procedures. Cautioned about using Herbal medication.

## 2021-06-11 NOTE — PROGRESS NOTES
"HPI: Mr. Caba presents for follow-up of his peripheral vascular disease. He had had a right femoral-popliteal bypass 2 years ago. He  is doing well, and is able to walk 2-3 blocks without stopping.  He then develops left calf claudication, but this does not interfere with his activities of daily living.    Allergies, Medications, Social History, Past Medical History and Past Surgical History were reviewed and are noted in the chart.      /72 (Patient Site: Left Arm, Patient Position: Sitting, Cuff Size: Adult Regular)  Pulse 72  Resp 16  Ht 5' 9\" (1.753 m)  Wt 170 lb (77.1 kg)  BMI 25.1 kg/m2  Body mass index is 25.1 kg/(m^2).    EXAM:  GENERAL: This is a thin male in no distress.      IMAGES:     I reviewed his ABIs with him, including the wave forms. The raw numbers suggest The graft is doing well, but that he does have significant left-sided peripheral vascular disease.       Assessment/Plan: Mr. Caba appears to be doing well.  To follow his graft and his peripheral vascular disease.   We will plan another ultrasound in 6 months to follow his peripheral vascular disease. He will return sooner if he is having problems.        Oni Cadena MD    "

## 2021-06-12 NOTE — PROGRESS NOTES
"Cardiology Progress Note    Assessment:    Coronary artery disease, status post multivessel stenting, last one in 2009, stable, no angina.    Sinus node dysfunction, mild, asymptomatic  Persistent atrial fibrillation, rate controlled, asymptomatic, on warfarin anticoagulation  Hypertension, controlled  Dyslipidemia, good control of LDL cholesterol  Peripheral arterial disease status post right femoral popliteal bypass in July 2015    Plan:  Check basic metabolic panel to assure safety of spironolactone  Follow-up in 6 months  Subjective:   This is 86 y.o. male who comes in today for follow-up visit.  He has done well.  He continues to be physically active he walks and mows his own lawn. He denies chest pain or shortness of breath.  He was weight has been stable.  He has not had syncope.    Review of Systems:   General: WNL  Eyes: WNL  Ears/Nose/Throat: WNL  Lungs: WNL  Heart: WNL  Stomach: WNL  Bladder: WNL  Muscle/Joints: WNL  Skin: WNL  Nervous System: WNL  Mental Health: WNL     Blood: WNL    Objective:   /80 (Patient Site: Right Arm, Patient Position: Sitting, Cuff Size: Adult Large)  Pulse 84  Resp 18  Ht 5' 9\" (1.753 m)  Wt 172 lb (78 kg)  BMI 25.4 kg/m2  Physical Exam:  GENERAL: no distress  NECK: No JVD  LUNGS: Clear to auscultation.  CARDIAC: irregular rhythm, S1 & S2 normal.  No heaves, thrills, gallops or murmurs.  ABDOMEN: flat, negative hepatosplenomegaly, soft and non-tender.  EXTREMITIES: No evidence of cyanosis, clubbing or edema.    Current Outpatient Prescriptions   Medication Sig Dispense Refill     amLODIPine (NORVASC) 5 MG tablet TAKE 1 TABLET BY MOUTH ONCE DAILY 90 tablet 3     cholecalciferol, vitamin D3, (VITAMIN D3) 2,000 unit cap Take 2,000 Units by mouth daily with supper. Takes at 5pm       folic acid (FOLVITE) 1 MG tablet TAKE 1 TABLET BY MOUTH ONCE DAILY 90 tablet 3     losartan (COZAAR) 50 MG tablet TAKE 1 TABLET (50 MG TOTAL) BY MOUTH 2 (TWO) TIMES A DAY. 180 tablet 3     " multivitamin capsule Take 1 capsule by mouth daily.       nitroglycerin (NITROSTAT) 0.4 MG SL tablet 1 tablet every 5 min as needed chest pain, max 3 tablets 25 tablet 0     rosuvastatin (CRESTOR) 10 MG tablet Take 1 tablet (10 mg total) by mouth bedtime. 90 tablet 3     sod bicarb-sod chlor-neti pot pkdv 1 Package into each nostril 2 (two) times a day. 1 each 2     spironolactone (ALDACTONE) 25 MG tablet TAKE 1 TABLET (25 MG TOTAL) BY MOUTH DAILY. 90 tablet 3     warfarin (COUMADIN) 1 MG tablet Take 1 mg daily 60 tablet 0     warfarin (COUMADIN) 2 MG tablet Take 2 mg daily 90 tablet 3     albuterol (VENTOLIN HFA) 90 mcg/actuation inhaler Inhale 2 puffs every 4 (four) hours as needed for wheezing. 1 Inhaler 0     No current facility-administered medications for this visit.        Cardiographics:    Echocardiogram: November 2014 Normal left ventricular size and systolic function.  Mild concentric left ventricular hypertrophy.  Left ventricular ejection fraction is visually estimated to be 70%.  No regional wall motion abnormalities.  No significant valvular abnormalities.      Stress Test: November 2014 1. Regadenoson stress.  2. No evidence of ischemia suggested by nuclear imaging.  3. Small to medium-sized area of fixed defect involving the mid to basal portion of  the inferolateral wall, which may represent diaphragmatic attenuation, although a  small area of underlying infarct cannot be excluded.  4. Normal ejection fraction without wall motion abnormality.      Coronary angio: 2009 stent to LAD, diffuse mild to moderate disease elsewhere     Holter: January 2017 1. Persistent atrial fibrillation with controlled ventricular response.  2. Moderately frequent ventricular premature beats noted.    Lab Results:       Lab Results   Component Value Date    CHOL 136 02/16/2017    CHOL 133 08/15/2016    CHOL 136 11/06/2014     Lab Results   Component Value Date    HDL 40 02/16/2017    HDL 39 (L) 08/15/2016    HDL 32 (L)  11/06/2014     Lab Results   Component Value Date    LDLCALC 59 02/16/2017    LDLCALC 61 08/15/2016    LDLCALC 46 11/06/2014     Lab Results   Component Value Date    TRIG 186 (H) 02/16/2017    TRIG 165 (H) 08/15/2016    TRIG 290 (H) 11/06/2014     No components found for: CHOLHDL  No results found for: BNP    Hayder (Yousif)  MD Mary

## 2021-06-12 NOTE — PROGRESS NOTES
INR 2.7 INR stable. Discussed continuing management of dose of Warfarin and returning in one month . No changes to diet needed at this time. Continue moderate intake of Vitamin K and call if increase bruising or unexplained bleeding. Call with medication changes or upcoming procedures.

## 2021-06-13 NOTE — PROGRESS NOTES
INR 1.4 pt stopped for 4 days for thoracentesis procedure. Will increase dose and retest in one week. Decrease greens. After talking with pt and discussing history of greens/salads and medication change. Pt will  continue  with current diet and dosing of Warfarin.  Continue with moderation of Vit K and green leafy vegetables. Cautioned to call with increase bruising or bleeding. Reminded to call with medication change especially antibiotic. Call with any questions or concerns or any up coming procedures. Cautioned about using Herbal medication.

## 2021-06-13 NOTE — PROGRESS NOTES
INR 2.1 will continue on current dose. Pt is having fluid build up in lung. Will retest INR in 4 weeks unless he needs to stop for lung issues. After talking with pt and discussing history of greens/salads and medication change. Pt will  continue  with current diet and dosing of Warfarin.  Continue with moderation of Vit K and green leafy vegetables. Cautioned to call with increase bruising or bleeding. Reminded to call with medication change especially antibiotic. Call with any questions or concerns or any up coming procedures. Cautioned about using Herbal medication.

## 2021-06-13 NOTE — PROGRESS NOTES
INR 2.3 pt is on antibiotics due to Valley Fever. May have a pleural tap next week. Cautioned pt that he may need to stop Warfarin for the procedure. Pt will call for appointment. Continue on current dose unless needing to stop Warfarin then call for instructions. Pt agrees with plan. After talking with pt and discussing history of greens/salads and medication change. Pt will  continue  with current diet and dosing of Warfarin.  Continue with moderation of Vit K and green leafy vegetables. Cautioned to call with increase bruising or bleeding. Reminded to call with medication change especially antibiotic. Call with any questions or concerns or any up coming procedures. Cautioned about using Herbal medication.
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
## 2021-06-14 NOTE — PROGRESS NOTES
INR 2.1 INR stable. Discussed continuing management of dose of Warfarin and returning in one month . No changes to diet needed at this time. Continue moderate intake of Vitamin K and call if increase bruising or unexplained bleeding. Call with medication changes or upcoming procedures.

## 2021-06-14 NOTE — PROGRESS NOTES
Instructed on proper use of Pro-air respiclick inhaler.  Demonstrated back correctly,   Number given to call for any questions or concerns.

## 2021-06-14 NOTE — PROGRESS NOTES
HPI: Mr. Caba presents for follow-up of his peripheral vascular disease. He had had a right femoral-popliteal bypass 2 1/2 years ago. He  is doing well in that regard, and is able to walk 2 blocks without stopping. He does have pain in his left leg which is constant, but he claudicates at 2 blocks on the left as well.    Allergies, Medications, Social History, Past Medical History and Past Surgical History were reviewed and are noted in the chart.    Review of Systems - Negative except he has had a varicose vein stripping on the left    /88  Pulse 70  Temp 98.2  F (36.8  C) (Oral)   There is no height or weight on file to calculate BMI.    EXAM:  GENERAL: This is a thin male in no distress.  HEAD AND NECK: Cranial nerves intact. No neck masses or bruits.  CHEST/LUNG: Clear  EXTREM: No hair growth on left. Elevation pallor and dependency rubor. right posterior tibial pulses palpable. Left varicosities and stasis dermatitis; left edema. Left foot cooler than right.    IMAGES:     I reviewed his ABIs with him. The raw numbers suggest he is doing well. His KIMBERLYN on the right is normal; on the left it is 0.71      Assessment/Plan: Mr. Caba appears to be doing well. He has mixed arterial and venous disease, but he has had the vein stripped on the left so I don't think a lot can be done with the venous side. We will have him do an exercise regime, and re-evaluate it in 6 months.. He will return in 6 months for a repeat ultrasound.        Oni Cadena MD

## 2021-06-14 NOTE — PROGRESS NOTES
Pt was here for surveillance and has known Bilat SFA occlusion, Right Fem pop.   Pt has been experiencing worsening pain. The left calf is sore all the time. Pt is not having claudication symptoms, but he gets rest pain at time in the left leg. Right leg is doing well.

## 2021-06-14 NOTE — PROGRESS NOTES
"PULMONARY CLINIC FOLLOW UP NOTE    History:     HPI: Juancho Caba is a 86 y.o. male, former smoker, with history of CAD, sick sinus syndrome, atrial fibrillation on anticoagulation, hypertension and hyperlipidemia who was referred here for evaluation of pleural effusion back on 9/2017.  Patient notes that he had \"valley fever\" back in 2005.  He was treated with antibiotics at that time.  He notes that he was hospitalized for about a week.      Patient notes that he is able to walk about 1-2 miles every morning and mows his own grass but notes that he is limited by leg aches.  Endorses a chronic cough that is productive of yellow/white sputum. He notes that he sometimes coughs after he eats.  He endorses PND but is not on any medications for it.  He denies any fever or night sweats.  He notes that he had a flu shot last year and had \"pneumonias\" afterwards.  He notes that he sometimes wheeze. No lumps or bumps.  He endorses leg swelling more on the left and there are plans for bypass.  For his right sided pleural effusion, and was treated with azithromycin.  He ended up having thoracentesis back on 9/26/2017.  Cytology was negative for malignancy however there were atypical mesothelial cells present.  Flow cytometry was negative.  He is here for follow-up today.    PMHx/PSHx:  CAD status post PCI  Sick sinus syndrome  Atrial fibrillation  History of MI  AAA  Hypertension and hyperlipidemia    Social Hx:  Former smoker.  He smokes approximately half a pack per day for 20 years.  He quit back in 1974  Used to work as an  for 3 M.    Meds and Allergies: See EHR for the updated medication list and Allergies. These were reviewed.   ROS: 10 point review of system done. Pertinent findings are noted in the HPI.    Exam/Data:   /76  Pulse 72  Resp 20  Wt 171 lb 11.2 oz (77.9 kg)  SpO2 97% Comment: RA  BMI 25.36 kg/m2, Body mass index is 25.36 kg/(m^2).  GEN: comfortable, NAD  HEENT: NCAT, EMOI, " mmm  LN: no cervical LAD   CVS: S1S2, RRR  Lung: decreased bs on right  Abd: soft, nt, + BS. No masses  Ext: 2+ edema  Neuro: nonfocal  Skin: no visible rash  Vasc: intact radial pulses bilaterally  Musculoskeletal: FROM all extremities  Psych: normal affect    Data:   Labs and Imaging personally reviewed.  Pertinent findings include:    CXR: FINDINGS: No change in the right pleural effusion and volume loss in the right base. Linear atelectasis right midlung zone as well. Left lung clear. Heart size normal. ASCVD aorta.     Thoracentesis 9/2017: 1.5 Liters of slightly cloudy yellow fluid.     FOLLOW UP: 4-8 weeks    Assessment/Plan:       Juancho Caba is a 86 y.o. male, distant history of tobacco use, with history of coronary disease, sick sinus syndrome, atrial fibrillation, history of MI, and history of peripheral vascular disease who is here for evaluation of right sided pleural effusion in the setting of shortness of breath.  He does not have any shortness of breath today. Echo from 2014 shows preserved ejection fraction.    He underwent thoracentesis on 9/2017 that was negative for malignancy but noted for atypical mesothelial cells. Etiology of his right-sided pleural effusion is likely volume overload versus chronic inflammation.    His Lasix was decreased from 40 mg to 20 mg daily last month.  Repeat chest x-ray showed moderate sized right sided effusion. His thoracentesis was tapped almost dry.  He also completed a course of Azithromcyin back on 9/2017.     Recommendations:  I have sent in[employer] message to PCP/Cardiology re increase lasix back to 40 mg daily  Start flonase for post nasal drip  Start albuterol inhaler for wheezing  PFT's next visit  Repeat CXR. If persistent after increasing Lasix, then we can consider tapping dry and doing a CT scan of the chest and/or bronchoscopy.      FOLLOW UP: 3 months with repeat CXR and PFT's.    Addendum: cards will be getting an echocardiogram      Naty QUIROZ  MD [facility]  Pulmonary and Critical Care Medicine  Electronically Signed on 12/6/2017    Current Outpatient Prescriptions   Medication Sig Dispense Refill     amLODIPine (NORVASC) 5 MG tablet TAKE 1 TABLET BY MOUTH ONCE DAILY 90 tablet 3     cholecalciferol, vitamin D3, (VITAMIN D3) 2,000 unit cap Take 2,000 Units by mouth daily with supper. Takes at 5pm       folic acid (FOLVITE) 1 MG tablet TAKE 1 TABLET BY MOUTH ONCE DAILY 90 tablet 3     furosemide (LASIX) 20 MG tablet Take 1 tablet (20 mg total) by mouth every morning. 30 tablet 5     losartan (COZAAR) 50 MG tablet TAKE 1 TABLET (50 MG TOTAL) BY MOUTH 2 (TWO) TIMES A DAY. 180 tablet 3     multivitamin capsule Take 1 capsule by mouth daily.       nitroglycerin (NITROSTAT) 0.4 MG SL tablet 1 tablet every 5 min as needed chest pain, max 3 tablets 25 tablet 0     rosuvastatin (CRESTOR) 10 MG tablet Take 1 tablet (10 mg total) by mouth bedtime. 90 tablet 3     sod bicarb-sod chlor-neti pot pkdv 1 Package into each nostril 2 (two) times a day. 1 each 2     spironolactone (ALDACTONE) 25 MG tablet TAKE 1 TABLET (25 MG TOTAL) BY MOUTH DAILY. 90 tablet 3     warfarin (COUMADIN) 1 MG tablet Take 1 mg daily (Patient taking differently: See Admin Instructions. Take 1 mg daily) 60 tablet 0     warfarin (COUMADIN) 2 MG tablet Take 2 mg daily (Patient taking differently: See Admin Instructions. Take 2 mg daily) 90 tablet 3     No current facility-administered medications for this visit.      Allergies   Allergen Reactions     Insulins Hives     ?Insulin given in hospital 2015; unknown type??     Sulfa (Sulfonamide Antibiotics) Other (See Comments)     Eyes saw lines

## 2021-06-15 NOTE — PROGRESS NOTES
"Speech Language/Pathology  Videofluoroscopic Swallow Study       Problem:  Patient Active Problem List   Diagnosis     Muscle Aches, Generalized (Myalgias)     Vitamin D Deficiency     Hyperlipidemia     Essential hypertension     Coronary Artery Disease     SSS (sick sinus syndrome)     Peripheral Vascular Disease     Benign Prostatic Hypertrophy     Hyperglycemia     Chest pain     Accelerated hypertension     PVD (peripheral vascular disease)     Persistent atrial fibrillation       Onset date: 2/2/2018 (order date)  Reason for evaluation: Assess for dysphagia  Pertinent History: As above  Current Diet: Regular textures and thin liquids  Baseline Diet: Regular textures and thin liquids    Patient is an 86 year old male referred due to concerns of dysphagia. Patient reports difficulty swallowing pills with water. He describes an incident in January in which he began choking while attempting to swallow a pill. He lost consciousness and fell and hit his head. He sustained a laceration to his scalp, but no head trauma. ED physicians determined that patient had experienced an episode of posttussive syncope. Patient states that, since then, he has been taking his pills mixed in yogurt, and \"they go down without a problem\". He denies any other difficulty swallowing, but later did acknowledge that food feels as though it sticks in his throat. The purpose of this study is to evaluate the oropharyngeal phase of patient's swallow and determine his aspiration risk. Patient also participated in an esophagram during this visit. Please refer to separate report for details.     Patient presents as alert and cooperative during this evaluation. His wife, Zara, was present after study to receive education regarding results and recommendations.   An  was not applicable    Of note, both patient and wife appear to be in good health. They are very active and walk 1.5 to 2 miles every day; often at the mall. Patient denies " "any recent illness. He reports that he has had pneumonia \"a couple of times\", but that this was many years ago.    Patient was given honey-thick, nectar-thick, thin, and pureed consistencies of barium, as well as a 12.5 mm barium tablet with water.    Oral Phase:    Dentition/Oral hygiene: Adequate    Bolus prep and oral control were not impaired.     Anterior-Posterior transit was not impaired.    Premature spillage did not occur with any consistency.    Tongue base retraction was not impaired.    Oral stasis did not occur with any consistency.    Pharyngeal Phase:    There was a single episode of deep laryngeal penetration and eventual aspiration with a trace amount of thin liquid. Patient had no cough or throat clear response at or below the vocal folds. It was difficulty to discern whether aspiration was direct and/or due to stasis in the pyriform sinuses.     There was one episode of transient laryngeal penetration with nectar-thick liquid. This was moderately deep and cleared spontaneously.    Swallow response was delayed with thin and nectar-thick liquid. This resulted in pourover past the epiglottis.    Epiglottic movement was complete consistently across texture trials, though mildly delayed with thin and nectar-thick liquid. This contributed to laryngeal penetration.    Pharyngeal stasis was noted both in the valleculae and the pyriform sinuses with all consistencies. This was generally mild after sips of thin and nectar-thick liquid, and moderate following presentations of honey-thick liquid and puree. Stasis partially cleared with a liquid wash, or when patient was cued to complete a second, dry swallow.    Pharyngeal constriction was moderately impaired.    Hyolaryngeal elevation was not impaired. Hyolaryngeal excursion was mildly reduced.    Cricopharyngeal function was not impaired, though patient was noted to have a mildly prominent cricopharyngeus muscle. Cervical esophageal function was not " impaired.    Assessment:    Patient demonstrated no oral dysphagia and mild-moderate pharyngeal dysphagia.    Patient is at mild aspiration risk with thin and nectar-thick liquid due to delayed timing of the swallow response, as well as delayed epiglottic inversion.    Rehab potential is fair based on prior level of function and evaluation results.    Recommendations:    Plan: Continue current diet of Regular textures and thin liquids as tolerated    Strategies: Patient to sit fully upright for all intake, eat slowly, and take one small (~1 tsp) bite or sip at a time. Patient to also alternate a bite of food with a sip of liquid, and swallow twice with each bite of food. Recommend that patient continue to take his pills mixed in yogurt, as this helps them go down easier.    Speech Therapy is not recommended at this time    Referrals: Patient to continue to follow with [facility]    Reviewed history of swallow problem with patient and wife, and verbally explained roles of SLP and radiologist. Verbally explained process of VFSS prior to administration of barium. Verbally explained results and recommendations to patient and wife. SLP answered questions and stated that a written copy of this report will be faxed to patient's referring provider. Patient and wife verbalized understanding.    35 dysphagia minutes    Yasmin Heath MA, CCC-SLP
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
## 2021-06-16 PROBLEM — J90 RECURRENT PLEURAL EFFUSION ON RIGHT: Status: ACTIVE | Noted: 2018-01-01

## 2021-06-16 PROBLEM — Z51.5 HOSPICE CARE: Status: ACTIVE | Noted: 2018-01-01

## 2021-06-16 PROBLEM — C45.7: Status: ACTIVE | Noted: 2018-01-01

## 2021-06-16 NOTE — PROGRESS NOTES
RESPIRATORY CARE NOTE     Patient Name: Juancho Caba  Today's Date: 3/15/2018     Complete PFT done with room air ABGs. Pt performed tests with good effort, 2.5 mg Albuterol neb given. Test results meet ATS criteria, except DLCO patient unable to acheive 85% of VC. Results scanned into epic. Pt left in no distress.     Component      Latest Ref Rng & Units 3/15/2018   pH, Arterial      7.37 - 7.44 7.44   pCO2, Arterial      35 - 45 mm Hg 39   pO2, Arterial      75 - 85 mm Hg 59 (L)   Bicarbonate, Arterial Calc      23.0 - 29.0 mmol/L 26.6   O2 Sat, Arterial      95.0 - 96.0 % 93.9 (L)   Oxyhemoglobin      95.0 - 96.0 % 91.0 (L)   Base Excess, Arterial Calc      mmol/L 2.3   Ventilation Mode       Room Air   Sample Stabilized Temperature      degrees C 37.0         Gretchen Goins

## 2021-06-16 NOTE — ANESTHESIA CARE TRANSFER NOTE
Last vitals:   Vitals:    03/21/18 1450   BP: 138/75   Pulse: 74   Resp: 12   Temp: 36.7  C (98  F)   SpO2: 95%     Patient's level of consciousness is drowsy  Spontaneous respirations: yes  Maintains airway independently: yes  Dentition unchanged: yes  Oropharynx: oropharynx clear of all foreign objects    QCDR Measures:  ASA# 20 - Surgical Safety Checklist: WHO surgical safety checklist completed prior to induction  PQRS# 430 - Adult PONV Prevention: 4558F - Pt received => 2 anti-emetic agents (different classes) preop & intraop  ASA# 8 - Peds PONV Prevention: NA - Not pediatric patient, not GA or 2 or more risk factors NOT present  PQRS# 424 - Claudia-op Temp Management: 4559F - At least one body temp DOCUMENTED => 35.5C or 95.9F within required timeframe  PQRS# 426 - PACU Transfer Protocol: - Transfer of care checklist used  ASA# 14 - Acute Post-op Pain: ASA14B - Patient did NOT experience pain >= 7 out of 10

## 2021-06-16 NOTE — PROGRESS NOTES
After talking with pt and discussing history of greens/salads and medication change. Pt will  continue  with current diet and dosing of Warfarin.  Continue with moderation of Vit K and green leafy vegetables. Cautioned to call with increase bruising or bleeding. Reminded to call with medication change especially antibiotic. Call with any questions or concerns or any up coming procedures. Cautioned about using Herbal medication.

## 2021-06-16 NOTE — ANESTHESIA PROCEDURE NOTES
Arterial Line  Reason for Procedure: hemodynamic monitoring and multiple ABGs  Patient location during procedure: OR pre-induction  Start time: 3/21/2018 1:27 PM  End time: 3/21/2018 1:31 PM  Staffing:  Performing  Anesthesiologist: ANTONETTE PERDOMO  Sterile Precautions:  sterile barriers used during insertion: cap, mask, sterile gloves, large sheet, and hand hygiene used.  Arterial Line:   Immediately prior to procedure a time out was called to verify the correct patient, procedure, equipment, support staff and site/side marked as required  Laterality: left  Location: radial  Prepped with: ChloroPrep    Needle gauge: 20 G  Number of Attempts: 1  Secured with: tape, transparent dressing and pressure dressing  Flushed with: saline    Ultrasound evaluation of access site: yes  Vessel patent by US exam    Concurrent real time visualization of needle entry

## 2021-06-16 NOTE — PROGRESS NOTES
Assessment/Plan:        Diagnoses and all orders for this visit:    Pleural effusion  -     US Thoracentesis; Future; Expected date: 2/14/18  -     Cell Ct/Diff, Body Fluid; Future  -     Glucose, Body Fluid; Future  -     Lactate Dehydrogenase (LDH), Body Fluid; Future  -     Protein, Body Fluid; Future  -     Triglycerides, Body Fluid; Future  -     Lipase, Body Fluid(FLIPAP); Future  -     Culture/Gram Stain: Body Fluid; Future  -     Medical Cytology  -     CT Chest With Contrast; Future; Expected date: 2/14/18  -     INR; Future    Dyspnea on exertion    86-year-old man with recurrent pleural effusion.  The elderly male is the most common patient for an idiopathic effusion.  No localizing information on his previous fluid studies.  He has a complex and essentially unknown exposure history from his time at .  No evidence of pleural plaques on x-ray but there is a low chance of some lymphatic issue related to past exposure that could cause problems.  Has had no surgical interventions in his chest that should interrupt lymph flow.    I do not get a sense that this is truly related to decompensated heart failure.  There is a possibility that his effusion could get worse if he had poor salt intake control or noncompliance with diuretics.  However, I think is unlikely this could be controlled with diuretics alone.    We will re-tap his effusion, study the fluid and do a CT scan following this drainage.  There is no etiologic information unfortunately the risk of recurrence is high.  If it recurs again we may have to consider advanced therapies like pleurodesis.  Of course this would be a last resort in the patient of his age.    His swallow issues are noted.  For him to have recurrent effusion related swelling issues there would typically be associated pneumonia and I do not see evidence of this on imaging or from his clinical picture.        Subjective:    Patient ID: Juancho Caba is a 86 y.o.  male.    HPI  86-year-old man with history of recurrent pleural effusion here for worsening shortness of breath.  It is similar to an episode that he had last year.  At that time he had a pleural effusion drained and it felt better.  He has had increasing shortness of breath for about 3 weeks.  It is worse with exertion.  Improves with rest.  Symptoms localized to his chest.  No pertinent positives.  Pertinent negatives include no fever, no hemoptysis, no leg swelling or abdominal swelling.  He takes Lasix regularly.    Review of Systems  Negative ×14 systems.        Objective:    Physical Exam   Constitutional: He is oriented to person, place, and time. He appears well-developed and well-nourished. No distress.   HENT:   Head: Normocephalic.   Nose: Nose normal.   Mouth/Throat: Oropharynx is clear and moist. No oropharyngeal exudate.   Eyes: Pupils are equal, round, and reactive to light. Right eye exhibits no discharge. Left eye exhibits no discharge. No scleral icterus.   Neck: Normal range of motion. No JVD present. No tracheal deviation present. No thyromegaly present.   Cardiovascular: Normal rate and regular rhythm.  Exam reveals no gallop and no friction rub.    No murmur heard.  Pulmonary/Chest: Effort normal. No stridor. No respiratory distress. He has no wheezes.   Decreased R base breath sounds   Abdominal: Soft. Bowel sounds are normal. He exhibits no distension. There is no tenderness.   Musculoskeletal: He exhibits no edema or tenderness.   Lymphadenopathy:     He has no cervical adenopathy.   Neurological: He is alert and oriented to person, place, and time. No cranial nerve deficit.   Skin: Skin is warm and dry. No rash noted. He is not diaphoretic. No erythema. No pallor.   Psychiatric: He has a normal mood and affect. His behavior is normal. Judgment and thought content normal.           Current Outpatient Prescriptions on File Prior to Visit   Medication Sig Dispense Refill     albuterol sulfate  90 mcg/actuation AePB Inhale 1 Inhaler 4 (four) times a day as needed. 1 each 3     amLODIPine (NORVASC) 5 MG tablet TAKE 1 TABLET BY MOUTH ONCE DAILY 90 tablet 3     cholecalciferol, vitamin D3, (VITAMIN D3) 2,000 unit cap Take 2,000 Units by mouth daily with supper. Takes at 5pm       folic acid (FOLVITE) 1 MG tablet TAKE 1 TABLET BY MOUTH ONCE DAILY 90 tablet 3     losartan (COZAAR) 50 MG tablet TAKE 1 TABLET (50 MG TOTAL) BY MOUTH 2 (TWO) TIMES A DAY. 180 tablet 3     multivitamin capsule Take 1 capsule by mouth daily.       nitroglycerin (NITROSTAT) 0.4 MG SL tablet 1 tablet every 5 min as needed chest pain, max 3 tablets 25 tablet 0     rosuvastatin (CRESTOR) 10 MG tablet TAKE 1 TABLET BY MOUTH AT BEDTIME 90 tablet 2     sod bicarb-sod chlor-neti pot pkdv 1 Package into each nostril 2 (two) times a day. 1 each 2     spironolactone (ALDACTONE) 25 MG tablet TAKE 1 TABLET (25 MG TOTAL) BY MOUTH DAILY. 90 tablet 3     tamsulosin (FLOMAX) 0.4 mg Cp24 Take 0.4 mg by mouth daily.  3     warfarin (COUMADIN) 1 MG tablet Take 1 mg daily (Patient taking differently: See Admin Instructions. Take 1 mg daily) 60 tablet 0     warfarin (COUMADIN) 2 MG tablet Take 2 mg daily (Patient taking differently: See Admin Instructions. Take 2 mg daily) 90 tablet 3     [DISCONTINUED] fluticasone (FLONASE) 50 mcg/actuation nasal spray 1 spray in each nostril daily 48 g 3     [DISCONTINUED] furosemide (LASIX) 20 MG tablet Take 1 tablet (20 mg total) by mouth every morning. 30 tablet 5     No current facility-administered medications on file prior to visit.      /70  Pulse 84  Resp 17  Wt 172 lb (78 kg)  SpO2 94% Comment: RA  BMI 25.04 kg/m2    Medical History  Active Ambulatory (Non-Hospital) Problems    Diagnosis     Persistent atrial fibrillation     PVD (peripheral vascular disease)     Chest pain     Accelerated hypertension     Muscle Aches, Generalized (Myalgias)     Vitamin D Deficiency     Hyperlipidemia     Essential  hypertension     Coronary Artery Disease     SSS (sick sinus syndrome)     Peripheral Vascular Disease     Benign Prostatic Hypertrophy     Hyperglycemia     Past Medical History:   Diagnosis Date     BPH (benign prostatic hyperplasia)      Bradycardia      CAD (coronary artery disease)      Cancer      Claudication      DJD (degenerative joint disease)      Heart attack      Hyperglycemia      Hyperlipidemia      Hyperlipidemia LDL goal < 70      Hypertension      Peripheral vascular disease      Pneumonia         Surgical History  He  has a past surgical history that includes pr aaa repair w/ visceral vessel involve, w/ prosthesis; Coronary stent placement; Skin grafting; Appendectomy; Bladder surgery; Transurethral resection of prostate; Hand surgery (Left); Inguinal hernia repair (Right); Cardiac catheterization; Eye surgery (Bilateral); Tonsillectomy; Knee surgery (Left, as a kid); Vein ligation and stripping (Left); and Femoral artery - tibial artery bypass graft (Right, 7/15/2015).       Social History  Reviewed, and he  reports that he quit smoking about 44 years ago. His smoking use included Cigarettes. He has a 10.00 pack-year smoking history. He has never used smokeless tobacco. He reports that he does not drink alcohol or use illicit drugs.    Worked with [employer].  Wide range of exposures.   Allergies  Allergies   Allergen Reactions     Insulins Hives     ?Insulin given in hospital 2015; unknown type??     Omeprazole Diarrhea     Sulfa (Sulfonamide Antibiotics) Other (See Comments)     Eyes saw lines    Family History  Reviewed, and family history includes Aortic aneurysm in his brother, brother, cousin, cousin, and cousin; Cancer in his mother; No Medical Problems in his father.                            Data Review - imaging, labs, and ekgs listed below were reviewed by me.  Chest XRay and chest CT images  interpreted personally.     Past Labs  Office Visit on 02/13/2018   Component Date Value     Sodium  02/13/2018 136      Potassium 02/13/2018 4.8      Chloride 02/13/2018 100      CO2 02/13/2018 28      Anion Gap, Calculation 02/13/2018 8      Glucose 02/13/2018 105      Calcium 02/13/2018 9.3      BUN 02/13/2018 18      Creatinine 02/13/2018 0.92      GFR MDRD Af Amer 02/13/2018 >60      GFR MDRD Non Af Amer 02/13/2018 >60      WBC 02/13/2018 7.1      RBC 02/13/2018 4.79      Hemoglobin 02/13/2018 14.2      Hematocrit 02/13/2018 43.4      MCV 02/13/2018 91      MCH 02/13/2018 29.6      MCHC 02/13/2018 32.7      RDW 02/13/2018 12.1      Platelets 02/13/2018 216      MPV 02/13/2018 7.6    Anticoag visit on 01/30/2018   Component Date Value     INR 01/30/2018 2.0    Office Visit on 01/11/2018   Component Date Value     WBC 01/11/2018 7.5      RBC 01/11/2018 4.67      Hemoglobin 01/11/2018 14.2      Hematocrit 01/11/2018 43.9      MCV 01/11/2018 94      MCH 01/11/2018 30.3      MCHC 01/11/2018 32.2      RDW 01/11/2018 12.8      Platelets 01/11/2018 225      MPV 01/11/2018 7.4      TSH 01/11/2018 1.91      Sodium 01/11/2018 138      Potassium 01/11/2018 4.4      Chloride 01/11/2018 100      CO2 01/11/2018 29      Anion Gap, Calculation 01/11/2018 9      Glucose 01/11/2018 122      BUN 01/11/2018 21      Creatinine 01/11/2018 1.02      GFR MDRD Af Amer 01/11/2018 >60      GFR MDRD Non Af Amer 01/11/2018 >60      Bilirubin, Total 01/11/2018 0.6      Calcium 01/11/2018 9.4      Protein, Total 01/11/2018 7.9      Albumin 01/11/2018 3.7      Alkaline Phosphatase 01/11/2018 91      AST 01/11/2018 22      ALT 01/11/2018 19    Admission on 01/03/2018, Discharged on 01/03/2018   Component Date Value     WBC 01/03/2018 7.3      RBC 01/03/2018 4.45      Hemoglobin 01/03/2018 13.5*     Hematocrit 01/03/2018 41.5      MCV 01/03/2018 93      MCH 01/03/2018 30.3      MCHC 01/03/2018 32.5      RDW 01/03/2018 13.1      Platelets 01/03/2018 200      MPV 01/03/2018 10.1      INR 01/03/2018 2.13*     VENTRICULAR RATE 01/03/2018 75       QRS DURATION 01/03/2018 94      Q-T INTERVAL 01/03/2018 392      QTC CALCULATION (BEZET) 01/03/2018 437      R AXIS 01/03/2018 5      T AXIS 01/03/2018 61      MUSE DIAGNOSIS 01/03/2018                      Value:Atrial fibrillation  Abnormal ECG  When compared with ECG of 09-NOV-2016 09:28,  Atrial fibrillation has replaced Atrial flutter  Confirmed by SAMPSON OLIVO MD LOC: ([id number]) on 1/3/2018 2:49:43 PM     Anticoag visit on 01/02/2018   Component Date Value     INR 01/02/2018 2.0        Past Imaging chest x-ray images interpreted by me from 7 February.  Moderate size right pleural effusion.  Xr Chest 2 Views    Result Date: 2/7/2018  XR CHEST 2 VIEWS 2/7/2018 8:52 AM INDICATION: Cough COMPARISON: 11/02/2017 FINDINGS: A moderate to large right pleural effusion has increased in size from previous. Underlying atelectasis in the right lower lobe. The left lung is clear.    Xr Swallow Study W Speech    Result Date: 2/8/2018  XR SWALLOW STUDY W SPEECH 2/8/2018 9:30 AM INDICATION: Dysphagia. TECHNIQUE: Routine. COMPARISON: None. FINDINGS: FLUOROSCOPIC TIME: 1 minutes NUMBER OF IMAGES: 1 Swallow study with Speech Pathology using multiple barium thicknesses. Moderate amount of stasis was present. A single episode of aspiration with thin liquid was seen (this may have represented a combination of overt aspiration and/or aspiration of residual material). Additional single episode of trace penetration with nectar. Otherwise, no penetration or aspiration. Barium pill was swallowed without difficulty, though became lodged with holdup at the distal esophagus.     Xr Esophagram    Result Date: 2/8/2018  XR ESOPHAGRAM 2/8/2018 9:20 AM INDICATION: Dysphagia, unspecified. TECHNIQUE: Routine. COMPARISON: None. FINDINGS: FLUOROSCOPIC TIME: 1.1 minutes NUMBER OF IMAGES: 9 ESOPHAGUS: Mild esophageal dysmotility noted. Otherwise, contrast flows freely through the esophagus, into the stomach and proximal small bowel. Swallow study  was performed immediately prior to this exam and persistent barium tablet lodged at the distal esophagus and esophagogastric junction. Contrast flows freely through the esophagogastric junction and past the pill without high-grade narrowing, though question of slight wasting where the pill is lodged.     CONCLUSION: 1.  Persistent lodged barium tablet from earlier same day swallow study. Question of minimal wasting along the distal esophagus, though there is no evidence for significant or high-grade narrowing. Oral contrast freely flows past this pill and through the patent esophagogastric junction. 2.  Esophageal dysmotility. 3.  Mild gastroesophageal reflux. NOTE: ABNORMAL REPORT
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
## 2021-06-16 NOTE — ANESTHESIA POSTPROCEDURE EVALUATION
Patient: Juancho Caba    RIGHT THORACOSCOPY/PLEURODESIS  Anesthesia type: general    Patient location: PACU  Last vitals:   Vitals:    03/21/18 1600   BP: 116/76   Pulse:    Resp:    Temp:    SpO2:      Post vital signs: stable  Level of consciousness: awake, alert and oriented  Post-anesthesia pain: pain controlled  Post-anesthesia nausea and vomiting: no  Pulmonary: unassisted, face mask  Cardiovascular: stable and blood pressure at baseline  Hydration: adequate  Anesthetic events: no    QCDR Measures:  ASA# 11 - Claudia-op Cardiac Arrest: ASA11B - Patient did NOT experience unanticipated cardiac arrest  ASA# 12 - Claudia-op Mortality Rate: ASA12B - Patient did NOT die  ASA# 13 - PACU Re-Intubation Rate: ASA13B - Patient did NOT require a new airway mgmt  ASA# 10 - Composite Anes Safety: ASA10A - No serious adverse event    Additional Notes:  Pt denies SOB.  Pain is being treated and has improved.

## 2021-06-16 NOTE — PROGRESS NOTES
86M patient of Dr. Strauss, persistent recurrent pleural effusion accompanied more recently by weight loss, GI symptoms.    Pleural fluid with atypical mesothelial cells (not unusual) and lymphocytosis, low glucose, brown.  Given his complex exposure history it is not clear how much asbestos exposure he has had.    It is not clear how much of his symptom burden is due to his effusion.      Typically we would avoid too much intervention in an idiopathic effusion in an elderly man.  Often they don't cause many issues and can be managed conservatively.  His comorbid weight loss and systemic symptoms raise concerns for neoplasm.    CT doesn't show obvious disease but does not rule out pleural process.    We will get CXR on Monday and refer to Dr. Bowden for consideration for diagnostic VATS.  I am concerned that Mr. Caba would not tolerate a pleurodesis well, and would consider making this a diagnostic procedure only.  I will defer to Dr. Bowden's opinion after discussion with the patient.  Mr. Caba is frustrated with his symptoms and wants an aggressive workup.

## 2021-06-16 NOTE — PROGRESS NOTES
INR 2.3 pt having consult with surgery for lung issues. Pt is accumulating fluid and having it drained. Will assess for surgery. Will use home health as needed. Continue current management dosing of Warfarin. Continue  diet of moderate Vitamin K intake. Discussed with pt the need to call with questions or concerns or any change in medication especially herbal medication or OTC. Call with increased bleeding or bruising or any upcoming procedures.

## 2021-06-16 NOTE — PROGRESS NOTES
Consultation - [facility]  Juancho Caba,  1931, MRN [id number]    PCP: Jon Maradiaga MD, [phone]    Assessment and Plan: Chronic idiopathic pleural effusion  Recommendations: We will arrange for a noninvasive pharmacologic stress nuclear study to rule out significant ischemic heart disease.  For now continue with current medical therapy.  Would discontinue warfarin 5 days before surgery.    Chief Complaint: Chronic dyspnea    HPI:  We have been requested by Dr. Adam Morales to evaluate Juancho Caba for consultation who is a  86 y.o. year old male for [facility] consultation.   Hx: 86-year-old man seen today in anticipation of upcoming surgical pleurodesis thorascopic surgery.  Patient has had a chronic recurrent idiopathic effusion.  He has had a a number of thoracentesis procedures performed but continues to recur.  Has had a distant history of myocardial infarction and coronary disease.  He has not had a recent manifestation of ischemic heart disease such as chest pain or pressure but he is chronically dyspneic and limits his activity.  His chronic atrial fibrillation for which he is on warfarin anticoagulation.    Medical History  Active Ambulatory (Non-Hospital) Problems    Diagnosis     Persistent atrial fibrillation     PVD (peripheral vascular disease)     Muscle Aches, Generalized (Myalgias)     Vitamin D Deficiency     Hypercholesteremia     Essential hypertension     Coronary Artery Disease     SSS (sick sinus syndrome)     Peripheral Vascular Disease     Benign Prostatic Hypertrophy     Hyperglycemia     Past Medical History:   Diagnosis Date     BPH (benign prostatic hyperplasia)      Bradycardia      CAD (coronary artery disease)      Cancer      Claudication      DJD (degenerative joint disease)      Heart attack      Hyperglycemia      Hyperlipidemia      Hyperlipidemia LDL goal < 70      Hypertension      Peripheral vascular disease      Pneumonia        Surgical  History  He  has a past surgical history that includes pr aaa repair w/ visceral vessel involve, w/ prosthesis; Coronary stent placement; Skin grafting; Appendectomy; Bladder surgery; Transurethral resection of prostate; Hand surgery (Left); Inguinal hernia repair (Right); Cardiac catheterization; Eye surgery (Bilateral); Tonsillectomy; Knee surgery (Left, as a kid); Vein ligation and stripping (Left); and Femoral artery - tibial artery bypass graft (Right, 7/15/2015).    Social History  Reviewed, and he  reports that he quit smoking about 44 years ago. His smoking use included Cigarettes. He has a 10.00 pack-year smoking history. He has never used smokeless tobacco. He reports that he does not drink alcohol or use illicit drugs.  Smoking status reviewed.  Social history othrwise not contributory to HPI.  Allergies  Allergies   Allergen Reactions     Insulins Hives     ?Insulin given in hospital 2015; unknown type??     Omeprazole Diarrhea     Sulfa (Sulfonamide Antibiotics) Other (See Comments)     Eyes saw lines       Family History  Reviewed, and family history includes Aortic aneurysm in his brother, brother, cousin, cousin, and cousin; Cancer in his mother; No Medical Problems in his father.  Extended Emergency Contact Information  Primary Emergency Contact: Kelley Caba  Address: [address] [place] [address] [id number] [place] [address]  Home Phone: [phone]  Relation: Spouse  Secondary Emergency Contact: Areli Fung   [place]  Work Phone: [phone]  Relation: Child  Family history otherwise negative or not conributory to HPI.    Psychosocial Needs  Social History     Social History Narrative    Patient is .  He quit smoking several years ago.  He endorses an aggressive exercise program that he does in regards to doing steps up and down at his home every day.     Additional psychosocial needs reviewed per nursing assessment.    Prior to Admission  Medications    (Not in a hospital admission)    Review of Systems:  A 12 point comprehensive review of systems was negative except as noted.  Review of systems is negative except for HPI  Physical Exam:  Less than 10 mL of urine  Vitals:    03/12/18 1336   BP: 120/60   Pulse: 68   Resp: 18     Head and neck without focal cranial neurologic defects.  JVD not distended.  Carotid upstroke normal without bruit.  External eye exam normal without icterus.  External ear exam normal.  Neck without cervical lymphadenopathy or thyromegaly.  Lungs: clear to auscultation bilaterally  Heart: Ir- regular rate and rhythm, S1, S2 normal, no murmur, click, rub or gallop   Abdomen with normal bowel tones.  Skin without rash, ecchymosis, lesions.  Neuromuscular tone normal.  Peripheral pulse intact and equal.  Joints without swelling or erythema.      [unfilled]    Pertinent Labs  Lab Results: personally reviewed.   Anticoag visit on 03/07/2018   Component Date Value     INR 03/07/2018 2.3    Anticoag visit on 02/28/2018   Component Date Value     INR 02/28/2018 1.3    Hospital Outpatient Visit on 02/19/2018   Component Date Value     Culture 02/19/2018 No Growth      Gram Stain Result 02/19/2018 1+ Polymorphonuclear leukocytes      Gram Stain Result 02/19/2018 No organisms seen      Color, Fluid 02/19/2018 Brown      Appearance, Fluid 02/19/2018 Cloudy      WBC, Fluid 02/19/2018 1127*     RBC, Fluid 02/19/2018 <50,000      Neutrophil % 02/19/2018 2      Lymphocyte % 02/19/2018 71      Monocyte % 02/19/2018 25      Macrophage % 02/19/2018 2      Protein, Fluid 02/19/2018 5.0      LD, Fluid 02/19/2018 1066      Glucose, Fluid 02/19/2018 45      Lipase Fluid Source 02/19/2018 Pleural Fluid:      Lipase, Fluid 02/19/2018 36      Triglycerides, Fluid 02/19/2018 15.3    Office Visit on 02/14/2018   Component Date Value     Case Report 02/19/2018                      Value:Medical Cytology                                  Case: [id number]                                    Authorizing Provider:  Charles Valdez MD  Collected:           02/19/2018 1443              Ordering Location:     [facility]     Received:            02/19/2018 4988              Pathologist:           Giovanna Gonzales MD                                                          Specimen:    Pleural, Right                                                                              Final Diagnosis 02/19/2018                      Value:RIGHT PLEURAL FLUID, THORACENTESIS:     1)  ATYPICAL MESOTHELIAL CELLS (SEE MICROSCOPIC DESCRIPTION)    2)  NEGATIVE FOR METASTATIC CARCINOMA    3)  1,100 ML PLEURAL EFFUSION    4)  NO EVIDENCE OF INCREASED ACUTE INFLAMMATION     Microscopic Description 02/19/2018                      Value:Concentrated cytologic smears show background chronic inflammation without increased acute inflammation. Moderate numbers of mesothelial cells are present. Occasional atypical enlarged mesothelial cells are present which may represent reactive changes. Mesothelioma can not be excluded. Metastatic carcinoma is not identified.     Clinical Information 02/19/2018                      Value:idiopathic pleural effusion     Specimen Description 02/19/2018                      Value:1100 ml cloudy shannan fluid   1 air dried slide   1 SurePath slide   1 cell block are prepared.     General Path Interpretat* 02/19/2018 Atypical cells present*     Charges 02/19/2018                      Value:CPT:  17984, 52384   ICD-10:  J90     Result Flag 02/19/2018 Abnormal*   Office Visit on 02/13/2018   Component Date Value     Sodium 02/13/2018 136      Potassium 02/13/2018 4.8      Chloride 02/13/2018 100      CO2 02/13/2018 28      Anion Gap, Calculation 02/13/2018 8      Glucose 02/13/2018 105      Calcium 02/13/2018 9.3      BUN 02/13/2018 18      Creatinine 02/13/2018 0.92      GFR MDRD Af Amer 02/13/2018 >60      GFR MDRD Non Af Amer 02/13/2018 >60      WBC  02/13/2018 7.1      RBC 02/13/2018 4.79      Hemoglobin 02/13/2018 14.2      Hematocrit 02/13/2018 43.4      MCV 02/13/2018 91      MCH 02/13/2018 29.6      MCHC 02/13/2018 32.7      RDW 02/13/2018 12.1      Platelets 02/13/2018 216      MPV 02/13/2018 7.6    Anticoag visit on 01/30/2018   Component Date Value     INR 01/30/2018 2.0    Office Visit on 01/11/2018   Component Date Value     WBC 01/11/2018 7.5      RBC 01/11/2018 4.67      Hemoglobin 01/11/2018 14.2      Hematocrit 01/11/2018 43.9      MCV 01/11/2018 94      MCH 01/11/2018 30.3      MCHC 01/11/2018 32.2      RDW 01/11/2018 12.8      Platelets 01/11/2018 225      MPV 01/11/2018 7.4      TSH 01/11/2018 1.91      Sodium 01/11/2018 138      Potassium 01/11/2018 4.4      Chloride 01/11/2018 100      CO2 01/11/2018 29      Anion Gap, Calculation 01/11/2018 9      Glucose 01/11/2018 122      BUN 01/11/2018 21      Creatinine 01/11/2018 1.02      GFR MDRD Af Amer 01/11/2018 >60      GFR MDRD Non Af Amer 01/11/2018 >60      Bilirubin, Total 01/11/2018 0.6      Calcium 01/11/2018 9.4      Protein, Total 01/11/2018 7.9      Albumin 01/11/2018 3.7      Alkaline Phosphatase 01/11/2018 91      AST 01/11/2018 22      ALT 01/11/2018 19    Admission on 01/03/2018, Discharged on 01/03/2018   Component Date Value     WBC 01/03/2018 7.3      RBC 01/03/2018 4.45      Hemoglobin 01/03/2018 13.5*     Hematocrit 01/03/2018 41.5      MCV 01/03/2018 93      MCH 01/03/2018 30.3      MCHC 01/03/2018 32.5      RDW 01/03/2018 13.1      Platelets 01/03/2018 200      MPV 01/03/2018 10.1      INR 01/03/2018 2.13*     VENTRICULAR RATE 01/03/2018 75      QRS DURATION 01/03/2018 94      Q-T INTERVAL 01/03/2018 392      QTC CALCULATION (BEZET) 01/03/2018 437      R AXIS 01/03/2018 5      T AXIS 01/03/2018 61      MUSE DIAGNOSIS 01/03/2018                      Value:Atrial fibrillation  Abnormal ECG  When compared with ECG of 09-NOV-2016 09:28,  Atrial fibrillation has replaced Atrial  flutter  Confirmed by SAMPSON OLIVO MD LOC:[facility] ([id number]) on 1/3/2018 2:49:43 PM     Anticoag visit on 01/02/2018   Component Date Value     INR 01/02/2018 2.0    Anticoag visit on 12/05/2017   Component Date Value     INR 12/05/2017 2.1    There may be more visits with results that are not included.      Pertinent Radiology  Radiology Results: See Report  EKG Results: See Report       Current Outpatient Prescriptions:      albuterol sulfate 90 mcg/actuation AePB, Inhale 1 Inhaler 4 (four) times a day as needed., Disp: 1 each, Rfl: 3     amLODIPine (NORVASC) 5 MG tablet, TAKE 1 TABLET BY MOUTH ONCE DAILY, Disp: 90 tablet, Rfl: 3     cholecalciferol, vitamin D3, (VITAMIN D3) 2,000 unit cap, Take 2,000 Units by mouth daily with supper. Takes at 5pm, Disp: , Rfl:      folic acid (FOLVITE) 1 MG tablet, TAKE 1 TABLET BY MOUTH ONCE DAILY, Disp: 90 tablet, Rfl: 3     losartan (COZAAR) 50 MG tablet, TAKE 1 TABLET (50 MG TOTAL) BY MOUTH 2 (TWO) TIMES A DAY., Disp: 180 tablet, Rfl: 4     multivitamin capsule, Take 1 capsule by mouth daily., Disp: , Rfl:      nitroglycerin (NITROSTAT) 0.4 MG SL tablet, 1 tablet every 5 min as needed chest pain, max 3 tablets, Disp: 25 tablet, Rfl: 0     rosuvastatin (CRESTOR) 10 MG tablet, TAKE 1 TABLET BY MOUTH AT BEDTIME, Disp: 90 tablet, Rfl: 2     sod bicarb-sod chlor-neti pot pkdv, 1 Package into each nostril 2 (two) times a day., Disp: 1 each, Rfl: 2     spironolactone (ALDACTONE) 25 MG tablet, TAKE 1 TABLET (25 MG TOTAL) BY MOUTH DAILY., Disp: 90 tablet, Rfl: 3     tamsulosin (FLOMAX) 0.4 mg Cp24, Take 0.4 mg by mouth daily., Disp: , Rfl: 3     warfarin (COUMADIN) 1 MG tablet, Take 1 mg daily (Patient taking differently: See Admin Instructions. Take 1 mg daily), Disp: 60 tablet, Rfl: 0     warfarin (COUMADIN) 2 MG tablet, Take 2 mg daily (Patient taking differently: See Admin Instructions. Take 2 mg daily), Disp: 90 tablet, Rfl: 3     furosemide (LASIX) 40 MG tablet, Take 40 mg by mouth 2  (two) times a day., Disp: , Rfl: 5
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
## 2021-06-18 ENCOUNTER — RECORDS - HEALTHEAST (OUTPATIENT)
Dept: ADMINISTRATIVE | Facility: CLINIC | Age: 86
End: 2021-06-18

## 2021-07-03 NOTE — ANESTHESIA PREPROCEDURE EVALUATION
Anesthesia Preprocedure Evaluation by Imelda Harris MD at 3/21/2018  8:26 AM     Author: Imelda Harris MD Service: -- Author Type: Physician    Filed: 3/21/2018 10:21 AM Date of Service: 3/21/2018  8:26 AM Status: Addendum    : Imelda Harris MD (Physician)    Related Notes: Original Note by Imelda Harris MD (Physician) filed at 3/21/2018  9:52 AM       Anesthesia Evaluation      Patient summary reviewed   No history of anesthetic complications     Airway   Mallampati: II  Neck ROM: limited   Pulmonary    (+) decreased breath sounds, a smoker (Former)    ROS comment: Recurrent pleural effusions  PE comment: Right base                         Cardiovascular - normal exam  Exercise tolerance: > or = 4 METS  (+) hypertension, past MI, CAD, dysrhythmias (Chronic a.fib, SSS), , hypercholesterolemia, PVD (s/p AAA endovascular repair, Left LE fem-pop bypass)    (-) murmur  ECG reviewed  Rhythm: irregular  Rate: normal,    no murmur   ROS comment: Negative stress test 3/19/18       Neuro/Psych      Endo/Other - negative ROS      GI/Hepatic/Renal - negative ROS      Other findings: 3/19/18 NM stress  Conclusion      When compared to the images of 11/6/2014, there has been no significant change.    Lexiscan stress ECG is negative for inducible myocardial ischemia.    Lexiscan nuclear stress test is negative for inducible myocardial ischemia or infarction.    Normal left ventricular size, wall motion and function. The calculated left ventricular ejection fraction is 69%.    XR CHEST 2 VIEWS  3/5/2018 7:59 AM     INDICATION: Pleural effusion, not elsewhere classified  COMPARISON: CT 02/19/2018     FINDINGS: There is a moderate sized hydropneumothorax on the right, similar to the prior CT. Persistent consolidation right lower lobe. The left lung and pleural space are clear.      Dental    (+) caps                           Anesthesia Plan  Planned anesthetic: general endotracheal  Left DL ETT  Glidescope      Magnesium sulfate 4 gm infusion for pain  Ketamine 40 mg IV for pain  Decadron 10 mg IV  Zofran    Thoracic epidural for post operative pain control, will check INR first  Prn Arterial line for BP monitoring and ABGs    INR 2.45, informed Dr. Bowden and he has requested FFP x 2 units and then to proceed with surgery.    ASA 3   Induction: intravenous   Anesthetic plan and risks discussed with: patient  Anesthesia plan special considerations: antiemetics, arterial catheterization, IV therapy two IVs,   Post-op plan: routine recovery

## 2024-06-04 NOTE — PROGRESS NOTES
INR 3.6 decrease dose to 1 mg Wed and Sat and then 1 mg all other days. After talking with pt and discussing history of greens/salads and medication change. Pt will  continue  with current diet and dosing of Warfarin.  Continue with moderation of Vit K and green leafy vegetables. Cautioned to call with increase bruising or bleeding. Reminded to call with medication change especially antibiotic. Call with any questions or concerns or any up coming procedures. Cautioned about using Herbal medication.     PAST SURGICAL HISTORY:  No significant past surgical history

## 2025-01-28 NOTE — ADDENDUM NOTE
Addendum Note by Oni Cadena MD at 6/5/2017 10:07 AM     Author: Oni Cadena MD Service: -- Author Type: Physician    Filed: 6/5/2017 10:07 AM Encounter Date: 6/5/2017 Status: Signed    : Oni Cadena MD (Physician)    Addended by: ONI CADENA on: 6/5/2017 10:07 AM        Modules accepted: Orders         Provider notified of FSBG and patient given fo.nned repeat FSBG 30 minutes after pt finishes his meal